# Patient Record
Sex: FEMALE | Race: OTHER | Employment: OTHER | URBAN - METROPOLITAN AREA
[De-identification: names, ages, dates, MRNs, and addresses within clinical notes are randomized per-mention and may not be internally consistent; named-entity substitution may affect disease eponyms.]

---

## 2018-09-20 ENCOUNTER — OFFICE VISIT (OUTPATIENT)
Dept: ONCOLOGY | Age: 53
End: 2018-09-20

## 2018-09-20 ENCOUNTER — PATIENT OUTREACH (OUTPATIENT)
Dept: ONCOLOGY | Age: 53
End: 2018-09-20

## 2018-09-20 VITALS
BODY MASS INDEX: 43.19 KG/M2 | RESPIRATION RATE: 16 BRPM | TEMPERATURE: 98.1 F | HEIGHT: 64 IN | SYSTOLIC BLOOD PRESSURE: 141 MMHG | OXYGEN SATURATION: 94 % | WEIGHT: 253 LBS | DIASTOLIC BLOOD PRESSURE: 88 MMHG | HEART RATE: 61 BPM

## 2018-09-20 DIAGNOSIS — C50.919 METASTATIC BREAST CANCER (HCC): Primary | ICD-10-CM

## 2018-09-20 DIAGNOSIS — C79.51 CANCER, METASTATIC TO BONE (HCC): ICD-10-CM

## 2018-09-20 PROBLEM — E66.01 OBESITY, MORBID (HCC): Status: ACTIVE | Noted: 2018-09-20

## 2018-09-20 RX ORDER — BISMUTH SUBSALICYLATE 262 MG
TABLET,CHEWABLE ORAL
COMMUNITY

## 2018-09-20 RX ORDER — PALBOCICLIB 75 MG/1
CAPSULE ORAL
COMMUNITY
Start: 2018-09-04 | End: 2018-10-16 | Stop reason: SDUPTHER

## 2018-09-20 RX ORDER — METFORMIN HYDROCHLORIDE 500 MG/1
TABLET ORAL DAILY
COMMUNITY

## 2018-09-20 RX ORDER — LETROZOLE 2.5 MG/1
TABLET, FILM COATED ORAL
Refills: 1 | COMMUNITY
Start: 2018-09-14 | End: 2018-10-16 | Stop reason: SDUPTHER

## 2018-09-20 RX ORDER — MULTIVITAMIN
1 TABLET ORAL DAILY
COMMUNITY

## 2018-09-20 RX ORDER — ASCORBIC ACID 250 MG
500 TABLET ORAL 2 TIMES DAILY
COMMUNITY

## 2018-09-20 NOTE — MR AVS SNAPSHOT
[address] [facility] 209 1400 [address] [place] [address] 
[phone] Patient: Evon Lopez MRN: [id number] :1965 Visit Information Date & Time Provider Department Dept. Phone Encounter #  
 2018  1:00 PM Gay Pena [address] [place] [address] Oncology at [facility] [phone] Follow-up Instructions Return in about 4 weeks (around 10/18/2018). Follow-up and Disposition History Upcoming Health Maintenance Date Due Hepatitis C Screening 1965 DTaP/Tdap/Td series (1 - Tdap) 1984 PAP AKA CERVICAL CYTOLOGY 1984 BREAST CANCER SCRN MAMMOGRAM 2013 FOBT Q 1 YEAR AGE 50-75 2013 Influenza Age 5 to Adult 2018 Allergies as of 2018  Review Complete On: 2018 By: Gay Pena, DO Not on File Current Immunizations  Reviewed on 2018 No immunizations on file. Reviewed by Eryn Sanchez LPN on  at  1:07 PM  
You Were Diagnosed With   
  
 Codes Comments Metastatic breast cancer ([facility]    -  Primary ICD-10-CM: Q11.784 ICD-9-CM: 174.9 Cancer, metastatic to bone [facility])     ICD-10-CM: C79.51 
ICD-9-CM: 198.5 Vitals BP Pulse Temp Resp Height(growth percentile) Weight(growth percentile) 141/88 61 98.1 °F (36.7 °C) (Oral) 16 5' 4\" (1.626 m) 253 lb (114.8 kg) SpO2 BMI Smoking Status 94% 43.43 kg/m2 Never Smoker BMI and BSA Data Body Mass Index Body Surface Area  
 43.43 kg/m 2 2.28 m 2 Preferred Pharmacy Pharmacy Name Phone [facility], [address] [phone] Your Updated Medication List  
  
   
This list is accurate as of 18  2:21 PM.  Always use your most recent med list.  
  
  
  
  
 calcium-cholecalciferol (D3) tablet Commonly known as:  CALTRATE 600+D Take 1 Tab by mouth daily. FEMARA 2.5 mg tablet Generic drug:  letrozole TK 1 T PO QD  
  
 IBRANCE 75 mg Cap Generic drug:  palbociclib  
  
 metFORMIN 500 mg tablet Commonly known as:  GLUCOPHAGE Take  by mouth daily. multivitamin tablet Commonly known as:  ONE A DAY  
multivitamin PROBIOTIC 4X PO  
Probiotic VITAMIN C 250 mg tablet Generic drug:  ascorbic acid (vitamin C) Take 500 mg by mouth two (2) times a day. XGEVA SC Deceleste Norris We Performed the Following [place] 27.29 [14004 CPT(R)] CBC WITH AUTOMATED DIFF [29601 CPT(R)] METABOLIC PANEL, COMPREHENSIVE [18128 CPT(R)] REFERRAL TO INTERNAL MEDICINE [REF40 Custom] Comments:  
 Please evaluate patient for needs PCP moved here Follow-up Instructions Return in about 4 weeks (around 10/18/2018). To-Do List   
 09/20/2018 Imaging:  CT ABD PELV W WO CONT   
  
 09/20/2018 Imaging:  CT CHEST W WO CONT Referral Information Referral ID Referred By Referred To  
  
 [id number] Lionel Estrada Not Available Visits Status Start Date End Date 1 New Request 9/20/18 9/20/19 If your referral has a status of pending review or denied, additional information will be sent to support the outcome of this decision. Introducing [facility]! [facility] introduces Outsmart patient portal. Now you can access parts of your medical record, email your doctor's office, and request medication refills online. 1. In your internet browser, go to [email or web address] 2. Click on the First Time User? Click Here link in the Sign In box. You will see the New Member Sign Up page. 3. Enter your Outsmart Access Code exactly as it appears below. You will not need to use this code after youve completed the sign-up process. If you do not sign up before the expiration date, you must request a new code. · Outsmart Access Code: [email or web address] Expires: 12/19/2018  2:21 PM 
 
 4. Enter the last four digits of your Social Security Number (xxxx) and Date of Birth (mm/dd/yyyy) as indicated and click Submit. You will be taken to the next sign-up page. 5. Create a DocTree ID. This will be your DocTree login ID and cannot be changed, so think of one that is secure and easy to remember. 6. Create a DocTree password. You can change your password at any time. 7. Enter your Password Reset Question and Answer. This can be used at a later time if you forget your password. 8. Enter your e-mail address. You will receive e-mail notification when new information is available in [address] 9. Click Sign Up. You can now view and download portions of your medical record. 10. Click the Download Summary menu link to download a portable copy of your medical information. If you have questions, please visit the Frequently Asked Questions section of the DocTree website. Remember, DocTree is NOT to be used for urgent needs. For medical emergencies, dial 911. Now available from your iPhone and Android! Please provide this summary of care documentation to your next provider. If you have any questions after today's visit, please call [phone].

## 2018-09-20 NOTE — PROGRESS NOTES
[employer] Medical Oncology at [facility]  
Nurse Navigator Note Patient Name: Dacia Chaudhari YOB: 1965 Advance Care Planning No flowsheet data found. Patient seen in consultation with Dr. Cathy Ahmadi. Patient attends visit alone today with her sister Rosa Maria Mishra on the phone. Patient is  and has recently transferred back to [place] with [employer]. She transferred to [place] from [place] about 9 years ago. She is independent and denies barriers. Dr. Angelica Morales plan: 
Assessment/PLAN:  
  
1)  Metastatic breast cancer ER+ HER2 negative to bones and liver from [place]. Records reviewed. Reviewed history with pt today. Prior stage 1 breast cancer 2011 and 2016. Pt reports prior hx of liver mets with hx of chemo IV and xeloda. No complete records from [place] onocology. Pt is a good historian. No evidence of liver mets now per pt. Last PET 3/18 negative except for bone mets. No recent labs here yet. Reviewed current treatment plan with pt today. Pt's sister is on phone during whole visit today. Navigator present for visit today. Pt is doing well on current regimen of femara/ ibrance/ xgeva. Will do CTs for re eval.  Ordered. Will do labs with PF. Set up xgeva here monthly. Pt clinically has no pain and is doing well overall.  
  
2) hx of reported liver mets with changes on CT chronically. Need records regarding this. Pt states no evidence of liver disease now.   
3)  Possible preDM. Pt needs PCP here and will help set up.  
  
4) pt works for [employer]. Moving back here from [place]. Sister on phone today for support. Visit done with navigator.  
  
F/u here in a month. Oncology Navigator Psychosocial Assessment Reason for Assessment:   
[]Depression  []Anxiety  []Caregiver [place]  []Maladaptive Coping with Serious Illness   [x]Other:Establishing care in [place] Sources of Information: [x]Patient  []Family  []Staff  []Medical Record Mental Status:   
[x]Alert  []Lethargic  []Unresponsive Oriented to:  [x]Person  [x]Place  [x]Time  [x]Situation Barriers to Learning:   
[]Language  []Developmental  []Cognitive  []Altered Mental Status  []Visual/Hearing Impairment  []Unable to Read/Write  []Motivational   [x]No Barriers Identified  []Other: 
 
Relationship Status: 
[]Single  [x]  []Significant Other/Life Partner  []  []  [] Living Circumstances: 
[]Lives Alone  [x]Family/Significant Other in Household  []Roommates  []Children in the Home  []Paid Caregivers  []Assisted Living Facility/Group Home  []Skilled [address]  []Homeless  []Incarcerated  []Environmental/Care Concerns  []Other: 
 
Support System:   
[x]Strong  []Fair  []Limited Financial/Legal Concerns:   
[]Uninsured  []Limited Income/Resources  []Non-Citizen  [x]No Concerns Identified  []Financial POA:   
[]Other: 
 
Sikh/Spiritual/Existential: 
[]Strong Sense of Spirituality  []Involved in [employer] []Request  Visit  []Expressing Spiritual/Existential Angst  [x]No Concerns Identified Coping with Illness:       
 Patient: Family/Caregiver:  
Understanding and Acceptance of Illness/Prognosis  [] [] Strong Sense of Resilience [] []  
Self Reflection [] [] Engaged Support System [] [] Does not Readily Discuss Illness [x] [] Denial of Terminal Status [] [] Anger [] [] Depression [] [] Anxiety/Fear [] []  
Bargaining [] [] Recent Diagnosis/Prognosis [] [] Difficulties with Body Image [] [] Loss of Identity [] [] Excessive Substance Use [] [] Mental Health History [] []  
Enmeshed Relationships [] [] History of Loss [] [] Anticipatory Grief [] [] Concern for Complicated Grief [] [] Suicidal Ideation or Plan [] [] Unable to assess [] [] Referrals:  
 
I. Transportation Medicaid (Kiki Chaves) [] ACS Road to Recovery [] Regional organization  [] Financial Assistance/Medication Access Patient assistance program (Care Card) [] Co-pay assistance  [] Leukemia & Lymphoma Society [] [address]  [] Patient [address] [] CancerCare  [] Emotional support Peer support group [] Local counseling [] Online support group [] Coordination of psychiatry consult [] Actions/Plan:  
 
NN introduced self and role. Contact information provided. Described integrative supports in CRC/BSCI available to patient and loved ones, including massage, yoga, support groups, counseling, art and music therapies and relaxation/meditation therapy. Reviewed benefits of MyChart account and encouraged activation. Patient verbalized understanding of above and agrees to contact us with questions or concerns.

## 2018-09-20 NOTE — PROGRESS NOTES
Cancer [place] at [address], [address], [address] Ma: [phone]  F: [phone]    Reason for Visit:   Marco Antonio Horner is a 52 y.o. female who is seen in consultation at the request of Dr. ROGER Schultz for evaluation of metastatic breast cancer. Treatment History:     Anastrozole/ ibrance/ xgeva started 7/18 in [place]    STAGE: dx 8/17 4 with L2 bone mets biopsied in [place]  Prior stage 1 RIGHT breast cancer 2016 lump/XRT tamoxifen    History of Present Illness:     Pt seen today for office consult for metastatic breast cancer ER+ HER2 negative seen and treated in [place]. Pt initially dx in 2011 with ? Met breast cancer to liver. Had chemo IV then xeloda. Had lupron. Pt had mammo 2015 which was abnormal and then had breast biopsy and breast cancer. Then pt had breast surgery/ XRT. During this time pt states liver mets \"went away\". F/u visit 2017 showed bone met lumbar spine. Pt was on arimidex. Pt is currently on femara/ ibrance/ xgeva since 3/18. Here today to establish care. Last PET 3/18 negative except bone. Liver contour abnormalities thought to be cirrhosis. No labs sent from [place]. Pt has a port. Pt works for [employer]. Pt denies pain. No past medical history on file. No past surgical history on file. Social History   Substance Use Topics    Smoking status: Never Smoker    Smokeless tobacco: Never Used    Alcohol use Not on file      No family history on file. Current Outpatient Prescriptions   Medication Sig    FEMARA 2.5 mg tablet TK 1 T PO QD    IBRANCE 75 mg cap     multivitamin (ONE A DAY) tablet multivitamin    B infantis/B ani/B ivory/B bifid (PROBIOTIC 4X PO) Probiotic    denosumab (XGEVA SC) Xgeva    metFORMIN (GLUCOPHAGE) 500 mg tablet Take  by mouth daily.  ascorbic acid, vitamin C, (VITAMIN C) 250 mg tablet Take 500 mg by mouth two (2) times a day.     calcium-cholecalciferol, D3, (CALTRATE 600+D) tablet Take 1 Tab by mouth daily. No current facility-administered medications for this visit. Not on File     Review of Systems: A complete review of systems was obtained, negative except as described above. Physical Exam:     Visit Vitals    /88    Pulse 61    Temp 98.1 °F (36.7 °C) (Oral)    Resp 16    Ht 5' 4\" (1.626 m)    Wt 253 lb (114.8 kg)    SpO2 94%    BMI 43.43 kg/m2     ECOG PS: 0  General: No distress  Eyes: PERRLA, anicteric sclerae  HENT: Atraumatic, OP clear  Neck: Supple  Lymphatic: No cervical, supraclavicular  Respiratory: CTAB, normal respiratory effort  CV: Normal rate, regular rhythm, no murmurs, no peripheral edema  GI: Soft, nontender, nondistended, no masses, no hepatomegaly, no splenomegaly  MS: Normal gait and station. Digits without clubbing or cyanosis. Skin: No rashes, ecchymoses, or petechiae. Normal temperature, turgor, and texture. Psych: Alert, oriented, appropriate affect, normal judgment/insight  Breast right breast small seroma at scar, no masses appreciated b/l     Results:   No results found for: WBC, HGB, HCT, PLT, MCV, ANEU, HGBPOC, HCTPOC, HGBEXT, HCTEXT, PLTEXT, HGBEXT, HCTEXT, PLTEXT  No results found for: NA, K, CL, CO2, GLU, BUN, CREA, GFRAA, GFRNA, CA, NAPOC, KPOCT, CLPOC, GLUCPOC, IBUN, CREAPOC, ICAI  No results found for: TBILI, ALT, SGOT, AP, TP, ALB, GLOB    None in our system  Records reviewed and summarized above. Pathology report(s) reviewed above. Radiology report(s) reviewed above. Assessment/PLAN:     1)  Metastatic breast cancer ER+ HER2 negative to bones and liver from [place]. Records reviewed. Reviewed history with pt today. Prior stage 1 breast cancer 2011 and 2016. Pt reports prior hx of liver mets with hx of chemo IV and xeloda. No complete records from [place] onocology. Pt is a good historian. No evidence of liver mets now per pt. Last PET 3/18 negative except for bone mets. No recent labs here yet.    Reviewed current treatment plan with pt today. Pt's sister is on phone during whole visit today. Navigator present for visit today. Pt is doing well on current regimen of femara/ ibrance/ xgeva. Will do CTs for re eval.  Ordered. Will do labs with PF. Set up xgeva here monthly. Pt clinically has no pain and is doing well overall. 2) hx of reported liver mets with changes on CT chronically. Need records regarding this. Pt states no evidence of liver disease now. 3)  Possible preDM. Pt needs PCP here and will help set up.     4) pt works for [employer]. Moving back here from [place]. Sister on phone today for support. Visit done with navigator. F/u here in a month. Call if questions. I appreciate the opportunity to participate in Ms. [facility].     Signed By: Ana Blair DO

## 2018-09-24 ENCOUNTER — TELEPHONE (OUTPATIENT)
Dept: ONCOLOGY | Age: 53
End: 2018-09-24

## 2018-09-24 NOTE — TELEPHONE ENCOUNTER
[phone]/Dayan  Per Dayan unable to obtain xgeva auth without recent calcium level. Rn advised would review records and obtain.

## 2018-09-24 NOTE — TELEPHONE ENCOUNTER
Call to previous oncology office to obtain most recent labs/Mray. Labs to be faxed from July and August.  Most recent Calcium 8.5 on 8/31/18.   Thanked for assist.

## 2018-09-25 RX ORDER — HEPARIN 100 UNIT/ML
300-500 SYRINGE INTRAVENOUS AS NEEDED
Status: CANCELLED
Start: 2018-09-27

## 2018-09-25 RX ORDER — SODIUM CHLORIDE 0.9 % (FLUSH) 0.9 %
10 SYRINGE (ML) INJECTION AS NEEDED
Status: CANCELLED
Start: 2018-09-27

## 2018-09-25 RX ORDER — SODIUM CHLORIDE 9 MG/ML
10 INJECTION INTRAMUSCULAR; INTRAVENOUS; SUBCUTANEOUS AS NEEDED
Status: CANCELLED | OUTPATIENT
Start: 2018-09-27

## 2018-09-27 ENCOUNTER — HOSPITAL ENCOUNTER (OUTPATIENT)
Dept: INFUSION THERAPY | Age: 53
Discharge: HOME | End: 2018-09-27
Payer: COMMERCIAL

## 2018-09-27 VITALS
DIASTOLIC BLOOD PRESSURE: 79 MMHG | HEART RATE: 67 BPM | RESPIRATION RATE: 18 BRPM | TEMPERATURE: 98.1 F | SYSTOLIC BLOOD PRESSURE: 123 MMHG

## 2018-09-27 DIAGNOSIS — C50.919 METASTATIC BREAST CANCER (HCC): ICD-10-CM

## 2018-09-27 DIAGNOSIS — C79.51 CANCER, METASTATIC TO BONE (HCC): ICD-10-CM

## 2018-09-27 LAB
ALBUMIN SERPL-MCNC: 3.7 G/DL (ref 3.5–5)
ALBUMIN/GLOB SERPL: 1.1 {RATIO} (ref 1.1–2.2)
ALP SERPL-CCNC: 48 U/L (ref 45–117)
ALT SERPL-CCNC: 28 U/L (ref 12–78)
ANION GAP SERPL CALC-SCNC: 7 MMOL/L (ref 5–15)
AST SERPL-CCNC: 24 U/L (ref 15–37)
BASOPHILS # BLD: 0.1 K/UL (ref 0–0.1)
BASOPHILS NFR BLD: 2 % (ref 0–1)
BILIRUB SERPL-MCNC: 0.5 MG/DL (ref 0.2–1)
BUN SERPL-MCNC: 19 MG/DL (ref 6–20)
BUN/CREAT SERPL: 18 (ref 12–20)
CALCIUM SERPL-MCNC: 8.5 MG/DL (ref 8.5–10.1)
CHLORIDE SERPL-SCNC: 106 MMOL/L (ref 97–108)
CO2 SERPL-SCNC: 27 MMOL/L (ref 21–32)
CREAT SERPL-MCNC: 1.05 MG/DL (ref 0.55–1.02)
DIFFERENTIAL METHOD BLD: ABNORMAL
EOSINOPHIL # BLD: 0.1 K/UL (ref 0–0.4)
EOSINOPHIL NFR BLD: 2 % (ref 0–7)
ERYTHROCYTE [DISTWIDTH] IN BLOOD BY AUTOMATED COUNT: 14.7 % (ref 11.5–14.5)
GLOBULIN SER CALC-MCNC: 3.3 G/DL (ref 2–4)
GLUCOSE SERPL-MCNC: 120 MG/DL (ref 65–100)
HCT VFR BLD AUTO: 36.3 % (ref 35–47)
HGB BLD-MCNC: 12 G/DL (ref 11.5–16)
IMM GRANULOCYTES # BLD: 0 K/UL (ref 0–0.04)
IMM GRANULOCYTES NFR BLD AUTO: 0 % (ref 0–0.5)
LYMPHOCYTES # BLD: 1.9 K/UL (ref 0.8–3.5)
LYMPHOCYTES NFR BLD: 50 % (ref 12–49)
MAGNESIUM SERPL-MCNC: 2 MG/DL (ref 1.6–2.4)
MCH RBC QN AUTO: 31.6 PG (ref 26–34)
MCHC RBC AUTO-ENTMCNC: 33.1 G/DL (ref 30–36.5)
MCV RBC AUTO: 95.5 FL (ref 80–99)
MONOCYTES # BLD: 0.3 K/UL (ref 0–1)
MONOCYTES NFR BLD: 9 % (ref 5–13)
NEUTS SEG # BLD: 1.4 K/UL (ref 1.8–8)
NEUTS SEG NFR BLD: 37 % (ref 32–75)
NRBC # BLD: 0 K/UL (ref 0–0.01)
NRBC BLD-RTO: 0 PER 100 WBC
PHOSPHATE SERPL-MCNC: 3.3 MG/DL (ref 2.6–4.7)
PLATELET # BLD AUTO: 160 K/UL (ref 150–400)
PMV BLD AUTO: 10.6 FL (ref 8.9–12.9)
POTASSIUM SERPL-SCNC: 3.9 MMOL/L (ref 3.5–5.1)
PROT SERPL-MCNC: 7 G/DL (ref 6.4–8.2)
RBC # BLD AUTO: 3.8 M/UL (ref 3.8–5.2)
SODIUM SERPL-SCNC: 140 MMOL/L (ref 136–145)
WBC # BLD AUTO: 3.8 K/UL (ref 3.6–11)

## 2018-09-27 PROCEDURE — 96372 THER/PROPH/DIAG INJ SC/IM: CPT

## 2018-09-27 PROCEDURE — 96523 IRRIG DRUG DELIVERY DEVICE: CPT

## 2018-09-27 PROCEDURE — 80053 COMPREHEN METABOLIC PANEL: CPT | Performed by: INTERNAL MEDICINE

## 2018-09-27 PROCEDURE — 86300 IMMUNOASSAY TUMOR CA 15-3: CPT | Performed by: INTERNAL MEDICINE

## 2018-09-27 PROCEDURE — 84100 ASSAY OF PHOSPHORUS: CPT | Performed by: INTERNAL MEDICINE

## 2018-09-27 PROCEDURE — 74011250636 HC RX REV CODE- 250/636: Performed by: NURSE PRACTITIONER

## 2018-09-27 PROCEDURE — 83735 ASSAY OF MAGNESIUM: CPT | Performed by: INTERNAL MEDICINE

## 2018-09-27 PROCEDURE — 77030012965 HC NDL HUBR BBMI -A

## 2018-09-27 PROCEDURE — 36415 COLL VENOUS BLD VENIPUNCTURE: CPT | Performed by: INTERNAL MEDICINE

## 2018-09-27 PROCEDURE — 85025 COMPLETE CBC W/AUTO DIFF WBC: CPT | Performed by: INTERNAL MEDICINE

## 2018-09-27 RX ORDER — SODIUM CHLORIDE 0.9 % (FLUSH) 0.9 %
10 SYRINGE (ML) INJECTION AS NEEDED
Status: ACTIVE | OUTPATIENT
Start: 2018-09-27 | End: 2018-09-28

## 2018-09-27 RX ORDER — SODIUM CHLORIDE 9 MG/ML
10 INJECTION INTRAMUSCULAR; INTRAVENOUS; SUBCUTANEOUS AS NEEDED
Status: ACTIVE | OUTPATIENT
Start: 2018-09-27 | End: 2018-09-28

## 2018-09-27 RX ORDER — HEPARIN 100 UNIT/ML
300-500 SYRINGE INTRAVENOUS AS NEEDED
Status: ACTIVE | OUTPATIENT
Start: 2018-09-27 | End: 2018-09-28

## 2018-09-27 RX ADMIN — DENOSUMAB 120 MG: 120 INJECTION SUBCUTANEOUS at 18:21

## 2018-09-27 NOTE — PROGRESS NOTES
Is pt being set up with ibrance and xgeva  Transfer here and was on these meds prior  Getting CTs also

## 2018-09-27 NOTE — PROGRESS NOTES
1 Pt admit to [facility] for Xgeva/Port Flush ambulatory in stable condition. Assessment completed. No new concerns voiced. Port accessed and flushed with positive blood return. Labs drawn per order and sent for processing. Visit Vitals    /79    Pulse 67    Temp 98.1 °F (36.7 °C)    Resp 18    Breastfeeding No       Medications:  Xgeva SQ left arm    1825 Pt tolerated treatment well. D/c home ambulatory in no distress. Pt aware of next [facility] appointment scheduled for 10/25/18. Recent Results (from the past 12 hour(s))   CBC WITH AUTOMATED DIFF    Collection Time: 09/27/18  4:52 PM   Result Value Ref Range    WBC 3.8 3.6 - 11.0 K/uL    RBC 3.80 3.80 - 5.20 M/uL    HGB 12.0 11.5 - 16.0 g/dL    HCT 36.3 35.0 - 47.0 %    MCV 95.5 80.0 - 99.0 FL    MCH 31.6 26.0 - 34.0 PG    MCHC 33.1 30.0 - 36.5 g/dL    RDW 14.7 (H) 11.5 - 14.5 %    PLATELET 829 533 - 789 K/uL    MPV 10.6 8.9 - 12.9 FL    NRBC 0.0 0  WBC    ABSOLUTE NRBC 0.00 0.00 - 0.01 K/uL    NEUTROPHILS 37 32 - 75 %    LYMPHOCYTES 50 (H) 12 - 49 %    MONOCYTES 9 5 - 13 %    EOSINOPHILS 2 0 - 7 %    BASOPHILS 2 (H) 0 - 1 %    IMMATURE GRANULOCYTES 0 0.0 - 0.5 %    ABS. NEUTROPHILS 1.4 (L) 1.8 - 8.0 K/UL    ABS. LYMPHOCYTES 1.9 0.8 - 3.5 K/UL    ABS. MONOCYTES 0.3 0.0 - 1.0 K/UL    ABS. EOSINOPHILS 0.1 0.0 - 0.4 K/UL    ABS. BASOPHILS 0.1 0.0 - 0.1 K/UL    ABS. IMM.  GRANS. 0.0 0.00 - 0.04 K/UL    DF AUTOMATED     METABOLIC PANEL, COMPREHENSIVE    Collection Time: 09/27/18  4:52 PM   Result Value Ref Range    Sodium 140 136 - 145 mmol/L    Potassium 3.9 3.5 - 5.1 mmol/L    Chloride 106 97 - 108 mmol/L    CO2 27 21 - 32 mmol/L    Anion gap 7 5 - 15 mmol/L    Glucose 120 (H) 65 - 100 mg/dL    BUN 19 6 - 20 MG/DL    Creatinine 1.05 (H) 0.55 - 1.02 MG/DL    BUN/Creatinine ratio 18 12 - 20      GFR est AA >60 >60 ml/min/1.73m2    GFR est non-AA 54 (L) >60 ml/min/1.73m2    Calcium 8.5 8.5 - 10.1 MG/DL    Bilirubin, total 0.5 0.2 - 1.0 MG/DL    ALT (SGPT) 28 12 - 78 U/L    AST (SGOT) 24 15 - 37 U/L    Alk.  phosphatase 48 45 - 117 U/L    Protein, total 7.0 6.4 - 8.2 g/dL    Albumin 3.7 3.5 - 5.0 g/dL    Globulin 3.3 2.0 - 4.0 g/dL    A-G Ratio 1.1 1.1 - 2.2     MAGNESIUM    Collection Time: 09/27/18  4:52 PM   Result Value Ref Range    Magnesium 2.0 1.6 - 2.4 mg/dL   PHOSPHORUS    Collection Time: 09/27/18  4:52 PM   Result Value Ref Range    Phosphorus 3.3 2.6 - 4.7 MG/DL

## 2018-09-28 NOTE — PROGRESS NOTES
Call to patient to clarify Ibrance dosing. No answer - left VM to return call. Will order at that time. Orders placed for Xgeva.

## 2018-09-29 LAB — CANCER AG27-29 SERPL-ACNC: 24.8 U/ML (ref 0–38.6)

## 2018-10-16 ENCOUNTER — TELEPHONE (OUTPATIENT)
Dept: ONCOLOGY | Age: 53
End: 2018-10-16

## 2018-10-16 DIAGNOSIS — C50.919 METASTATIC BREAST CANCER (HCC): Primary | ICD-10-CM

## 2018-10-16 RX ORDER — LETROZOLE 2.5 MG/1
2.5 TABLET, FILM COATED ORAL DAILY
Qty: 30 TAB | Refills: 3 | Status: CANCELLED | OUTPATIENT
Start: 2018-10-16

## 2018-10-16 NOTE — TELEPHONE ENCOUNTER
HIPAA verified. Stated family hx of breast cancer in mother. Stated had BRCA testing 2012 which was negative. Sister concerned about developing breast cancer and is on breast cancer prevention. Patient wanted JEANNIE testing as recommended by sister's providers. Patient inquired if insurance would cover testing. RN advised to contact customer service on back of patient's insurance card to inquire about genetic testing. RN inquired if patient is continuing to receive Ibrance through out of state provider as would need 1-2 weeks to transition to our providers. Patient stated to order ibrance and femara now. Stated will call back with local pharmacy. Ibrance filled through [facility]. Advised would forward to provider for review and scripts.

## 2018-10-17 NOTE — TELEPHONE ENCOUNTER
HIPAA verified. Advised of provider note. Patient stated is in process of moving from [place] and will be in [place] next week. Patient stated is taking 75 mg of ibrance. Stated has not chosen local pharmacy yet. Stated had not heard from schedulers for CT scan. Advised notes stated LM 9/24/18 and RN gave scheduling contact #. Patient stated that she spoke with insurance company and genetic testing is covered for breast cancer prevention/sister. Patient stated that she has a code to use for this test.    Patient to call with info as per note. Verbalized understanding.

## 2018-10-23 RX ORDER — SODIUM CHLORIDE 0.9 % (FLUSH) 0.9 %
10 SYRINGE (ML) INJECTION AS NEEDED
Status: CANCELLED
Start: 2018-10-25

## 2018-10-23 RX ORDER — HEPARIN 100 UNIT/ML
300-500 SYRINGE INTRAVENOUS AS NEEDED
Status: CANCELLED
Start: 2018-11-28

## 2018-10-23 RX ORDER — HEPARIN 100 UNIT/ML
300-500 SYRINGE INTRAVENOUS AS NEEDED
Status: CANCELLED
Start: 2018-12-20

## 2018-10-23 RX ORDER — SODIUM CHLORIDE 0.9 % (FLUSH) 0.9 %
10 SYRINGE (ML) INJECTION AS NEEDED
Status: CANCELLED
Start: 2018-12-20

## 2018-10-23 RX ORDER — SODIUM CHLORIDE 9 MG/ML
10 INJECTION INTRAMUSCULAR; INTRAVENOUS; SUBCUTANEOUS AS NEEDED
Status: CANCELLED | OUTPATIENT
Start: 2018-11-28

## 2018-10-23 RX ORDER — SODIUM CHLORIDE 9 MG/ML
10 INJECTION INTRAMUSCULAR; INTRAVENOUS; SUBCUTANEOUS AS NEEDED
Status: CANCELLED | OUTPATIENT
Start: 2018-10-25

## 2018-10-23 RX ORDER — HEPARIN 100 UNIT/ML
300-500 SYRINGE INTRAVENOUS AS NEEDED
Status: CANCELLED
Start: 2018-10-25

## 2018-10-23 RX ORDER — SODIUM CHLORIDE 0.9 % (FLUSH) 0.9 %
10 SYRINGE (ML) INJECTION AS NEEDED
Status: CANCELLED
Start: 2018-11-28

## 2018-10-23 RX ORDER — SODIUM CHLORIDE 9 MG/ML
10 INJECTION INTRAMUSCULAR; INTRAVENOUS; SUBCUTANEOUS AS NEEDED
Status: CANCELLED | OUTPATIENT
Start: 2018-12-20

## 2018-10-23 NOTE — TELEPHONE ENCOUNTER
[id number]   Spoke with Robert Vogel @ [address] Per Robert Vogel stated that [address] contacted [place] provider/Dr. Yani Soto for refill, but no refill recd. RN advised that patient transferring care to [place]. Fax for [address] is:  [phone]. Ibrance script faxed complete.

## 2018-10-25 ENCOUNTER — HOSPITAL ENCOUNTER (OUTPATIENT)
Dept: INFUSION THERAPY | Age: 53
Discharge: HOME | End: 2018-10-25
Payer: COMMERCIAL

## 2018-10-25 VITALS
HEART RATE: 62 BPM | DIASTOLIC BLOOD PRESSURE: 79 MMHG | SYSTOLIC BLOOD PRESSURE: 127 MMHG | TEMPERATURE: 98 F | RESPIRATION RATE: 18 BRPM

## 2018-10-25 DIAGNOSIS — C50.919 METASTATIC BREAST CANCER (HCC): Primary | ICD-10-CM

## 2018-10-25 DIAGNOSIS — C79.51 CANCER, METASTATIC TO BONE (HCC): ICD-10-CM

## 2018-10-25 LAB
ALBUMIN SERPL-MCNC: 3.6 G/DL (ref 3.5–5)
ALBUMIN/GLOB SERPL: 1 {RATIO} (ref 1.1–2.2)
ALP SERPL-CCNC: 44 U/L (ref 45–117)
ALT SERPL-CCNC: 23 U/L (ref 12–78)
ANION GAP SERPL CALC-SCNC: 7 MMOL/L (ref 5–15)
AST SERPL-CCNC: 16 U/L (ref 15–37)
BASOPHILS # BLD: 0.1 K/UL (ref 0–0.1)
BASOPHILS NFR BLD: 1 % (ref 0–1)
BILIRUB SERPL-MCNC: 0.5 MG/DL (ref 0.2–1)
BUN SERPL-MCNC: 16 MG/DL (ref 6–20)
BUN/CREAT SERPL: 16 (ref 12–20)
CALCIUM SERPL-MCNC: 8.4 MG/DL (ref 8.5–10.1)
CHLORIDE SERPL-SCNC: 106 MMOL/L (ref 97–108)
CO2 SERPL-SCNC: 26 MMOL/L (ref 21–32)
CREAT SERPL-MCNC: 0.98 MG/DL (ref 0.55–1.02)
DIFFERENTIAL METHOD BLD: ABNORMAL
EOSINOPHIL # BLD: 0.1 K/UL (ref 0–0.4)
EOSINOPHIL NFR BLD: 3 % (ref 0–7)
ERYTHROCYTE [DISTWIDTH] IN BLOOD BY AUTOMATED COUNT: 14.6 % (ref 11.5–14.5)
GLOBULIN SER CALC-MCNC: 3.7 G/DL (ref 2–4)
GLUCOSE SERPL-MCNC: 100 MG/DL (ref 65–100)
HCT VFR BLD AUTO: 37.4 % (ref 35–47)
HGB BLD-MCNC: 12.2 G/DL (ref 11.5–16)
IMM GRANULOCYTES # BLD: 0 K/UL (ref 0–0.04)
IMM GRANULOCYTES NFR BLD AUTO: 1 % (ref 0–0.5)
LYMPHOCYTES # BLD: 1.9 K/UL (ref 0.8–3.5)
LYMPHOCYTES NFR BLD: 53 % (ref 12–49)
MAGNESIUM SERPL-MCNC: 2.2 MG/DL (ref 1.6–2.4)
MCH RBC QN AUTO: 30.7 PG (ref 26–34)
MCHC RBC AUTO-ENTMCNC: 32.6 G/DL (ref 30–36.5)
MCV RBC AUTO: 94.2 FL (ref 80–99)
MONOCYTES # BLD: 0.2 K/UL (ref 0–1)
MONOCYTES NFR BLD: 6 % (ref 5–13)
NEUTS SEG # BLD: 1.3 K/UL (ref 1.8–8)
NEUTS SEG NFR BLD: 36 % (ref 32–75)
NRBC # BLD: 0 K/UL (ref 0–0.01)
NRBC BLD-RTO: 0 PER 100 WBC
PHOSPHATE SERPL-MCNC: 3.6 MG/DL (ref 2.6–4.7)
PLATELET # BLD AUTO: 143 K/UL (ref 150–400)
PMV BLD AUTO: 11.1 FL (ref 8.9–12.9)
POTASSIUM SERPL-SCNC: 3.7 MMOL/L (ref 3.5–5.1)
PROT SERPL-MCNC: 7.3 G/DL (ref 6.4–8.2)
RBC # BLD AUTO: 3.97 M/UL (ref 3.8–5.2)
SODIUM SERPL-SCNC: 139 MMOL/L (ref 136–145)
WBC # BLD AUTO: 3.5 K/UL (ref 3.6–11)

## 2018-10-25 PROCEDURE — 96372 THER/PROPH/DIAG INJ SC/IM: CPT

## 2018-10-25 PROCEDURE — 86300 IMMUNOASSAY TUMOR CA 15-3: CPT | Performed by: INTERNAL MEDICINE

## 2018-10-25 PROCEDURE — 83735 ASSAY OF MAGNESIUM: CPT | Performed by: INTERNAL MEDICINE

## 2018-10-25 PROCEDURE — 85025 COMPLETE CBC W/AUTO DIFF WBC: CPT | Performed by: INTERNAL MEDICINE

## 2018-10-25 PROCEDURE — 74011250636 HC RX REV CODE- 250/636: Performed by: INTERNAL MEDICINE

## 2018-10-25 PROCEDURE — 36415 COLL VENOUS BLD VENIPUNCTURE: CPT | Performed by: INTERNAL MEDICINE

## 2018-10-25 PROCEDURE — 80053 COMPREHEN METABOLIC PANEL: CPT | Performed by: INTERNAL MEDICINE

## 2018-10-25 PROCEDURE — 84100 ASSAY OF PHOSPHORUS: CPT | Performed by: INTERNAL MEDICINE

## 2018-10-25 RX ORDER — SODIUM CHLORIDE 9 MG/ML
10 INJECTION INTRAMUSCULAR; INTRAVENOUS; SUBCUTANEOUS AS NEEDED
Status: ACTIVE | OUTPATIENT
Start: 2018-10-25 | End: 2018-10-26

## 2018-10-25 RX ORDER — SODIUM CHLORIDE 0.9 % (FLUSH) 0.9 %
10 SYRINGE (ML) INJECTION AS NEEDED
Status: ACTIVE | OUTPATIENT
Start: 2018-10-25 | End: 2018-10-26

## 2018-10-25 RX ORDER — HEPARIN 100 UNIT/ML
300-500 SYRINGE INTRAVENOUS AS NEEDED
Status: ACTIVE | OUTPATIENT
Start: 2018-10-25 | End: 2018-10-26

## 2018-10-25 RX ADMIN — DENOSUMAB 120 MG: 120 INJECTION SUBCUTANEOUS at 18:26

## 2018-10-25 NOTE — PROGRESS NOTES
1 Pt admit to [facility] for Xgeva/Port Flush ambulatory in stable condition. Assessment completed. No new concerns voiced. Port accessed and flushed with positive blood return. Labs drawn per order and sent for processing. Visit Vitals  /79   Pulse 62   Temp 98 °F (36.7 °C)   Resp 18   Breastfeeding? No       Medications:  Xgeva SQ left arm    1800 Pt tolerated treatment well. D/c home ambulatory in no distress. Pt aware of next [facility] appointment scheduled for 11/28/18. Recent Results (from the past 12 hour(s))   CBC WITH AUTOMATED DIFF    Collection Time: 10/25/18  5:04 PM   Result Value Ref Range    WBC 3.5 (L) 3.6 - 11.0 K/uL    RBC 3.97 3.80 - 5.20 M/uL    HGB 12.2 11.5 - 16.0 g/dL    HCT 37.4 35.0 - 47.0 %    MCV 94.2 80.0 - 99.0 FL    MCH 30.7 26.0 - 34.0 PG    MCHC 32.6 30.0 - 36.5 g/dL    RDW 14.6 (H) 11.5 - 14.5 %    PLATELET 434 (L) 484 - 400 K/uL    MPV 11.1 8.9 - 12.9 FL    NRBC 0.0 0  WBC    ABSOLUTE NRBC 0.00 0.00 - 0.01 K/uL    NEUTROPHILS 36 32 - 75 %    LYMPHOCYTES 53 (H) 12 - 49 %    MONOCYTES 6 5 - 13 %    EOSINOPHILS 3 0 - 7 %    BASOPHILS 1 0 - 1 %    IMMATURE GRANULOCYTES 1 (H) 0.0 - 0.5 %    ABS. NEUTROPHILS 1.3 (L) 1.8 - 8.0 K/UL    ABS. LYMPHOCYTES 1.9 0.8 - 3.5 K/UL    ABS. MONOCYTES 0.2 0.0 - 1.0 K/UL    ABS. EOSINOPHILS 0.1 0.0 - 0.4 K/UL    ABS. BASOPHILS 0.1 0.0 - 0.1 K/UL    ABS. IMM.  GRANS. 0.0 0.00 - 0.04 K/UL    DF AUTOMATED

## 2018-10-26 ENCOUNTER — TELEPHONE (OUTPATIENT)
Dept: ONCOLOGY | Age: 53
End: 2018-10-26

## 2018-10-26 NOTE — PROGRESS NOTES
HIPAA verified. Advised of provider note. [facility]. Has not picked local pharmacy yet as is moving. Inquired about genetic testing . RN advised to make appt as per last office note to discuss with provider.   Verbalized understanding and thanked for assist.

## 2018-10-26 NOTE — TELEPHONE ENCOUNTER
Patient called and stated that she got a code from insurance (code [id number]) Jr [place] for gentic testing, and would like to see if the doctor would approve it.      # [phone]

## 2018-10-26 NOTE — TELEPHONE ENCOUNTER
Patient advised could discuss genetic testing with provider at next office visit in call 10/26/18 result note.

## 2018-10-27 LAB — CANCER AG27-29 SERPL-ACNC: 21.4 U/ML (ref 0–38.6)

## 2018-10-31 NOTE — TELEPHONE ENCOUNTER
Patient has office appointment 11/8/18 and will obtain genetic testing at that time per provider order.

## 2018-11-02 ENCOUNTER — TELEPHONE (OUTPATIENT)
Dept: ONCOLOGY | Age: 53
End: 2018-11-02

## 2018-11-02 DIAGNOSIS — C50.919 METASTATIC BREAST CANCER (HCC): Primary | ICD-10-CM

## 2018-11-02 DIAGNOSIS — C79.51 CANCER, METASTATIC TO BONE (HCC): ICD-10-CM

## 2018-11-02 NOTE — TELEPHONE ENCOUNTER
Stated P2P needed for CPT codes:  85186 and 41140 for test scheduled 11/3/18. Per Brad Michael notes reflect that out office was called. Chart reviewed and no documentation of call. RN advised office not able to accommodate request this late in day. Brad Michael stated would call patient and reschedule to next Tuesday. Case #:  [id number]  [employer] #:  [phone]  To provider for review.

## 2018-11-06 ENCOUNTER — TELEPHONE (OUTPATIENT)
Dept: ONCOLOGY | Age: 53
End: 2018-11-06

## 2018-11-06 NOTE — TELEPHONE ENCOUNTER
Per Carlos Gilbert requesting to know status of CT scan. Orders re-entered 11/2/18 per NP. Media reflects recommendation of ordered scans. Carlos Gilbert will check ins approval and return call.

## 2018-11-07 ENCOUNTER — HOSPITAL ENCOUNTER (OUTPATIENT)
Dept: CT IMAGING | Age: 53
Discharge: HOME | End: 2018-11-07
Attending: INTERNAL MEDICINE
Payer: COMMERCIAL

## 2018-11-07 DIAGNOSIS — C79.51 CANCER, METASTATIC TO BONE (HCC): ICD-10-CM

## 2018-11-07 DIAGNOSIS — C50.919 METASTATIC BREAST CANCER (HCC): ICD-10-CM

## 2018-11-07 PROCEDURE — 74011000258 HC RX REV CODE- 258: Performed by: RADIOLOGY

## 2018-11-07 PROCEDURE — 74011636320 HC RX REV CODE- 636/320: Performed by: RADIOLOGY

## 2018-11-07 PROCEDURE — 71260 CT THORAX DX C+: CPT

## 2018-11-07 PROCEDURE — 74177 CT ABD & PELVIS W/CONTRAST: CPT

## 2018-11-07 PROCEDURE — 74011250636 HC RX REV CODE- 250/636

## 2018-11-07 RX ORDER — SODIUM CHLORIDE 0.9 % (FLUSH) 0.9 %
10 SYRINGE (ML) INJECTION
Status: COMPLETED | OUTPATIENT
Start: 2018-11-07 | End: 2018-11-07

## 2018-11-07 RX ORDER — HEPARIN 100 UNIT/ML
SYRINGE INTRAVENOUS
Status: COMPLETED
Start: 2018-11-07 | End: 2018-11-07

## 2018-11-07 RX ADMIN — IOPAMIDOL 100 ML: 755 INJECTION, SOLUTION INTRAVENOUS at 09:09

## 2018-11-07 RX ADMIN — Medication 10 ML: at 09:09

## 2018-11-07 RX ADMIN — HEPARIN 500 UNITS: 100 SYRINGE at 09:08

## 2018-11-07 RX ADMIN — SODIUM CHLORIDE 100 ML: 900 INJECTION, SOLUTION INTRAVENOUS at 09:09

## 2018-11-08 ENCOUNTER — DOCUMENTATION ONLY (OUTPATIENT)
Dept: ONCOLOGY | Age: 53
End: 2018-11-08

## 2018-11-08 ENCOUNTER — OFFICE VISIT (OUTPATIENT)
Dept: ONCOLOGY | Age: 53
End: 2018-11-08

## 2018-11-08 VITALS
HEART RATE: 66 BPM | OXYGEN SATURATION: 96 % | SYSTOLIC BLOOD PRESSURE: 117 MMHG | HEIGHT: 64 IN | RESPIRATION RATE: 16 BRPM | DIASTOLIC BLOOD PRESSURE: 84 MMHG | TEMPERATURE: 98.6 F | BODY MASS INDEX: 43.87 KG/M2 | WEIGHT: 257 LBS

## 2018-11-08 DIAGNOSIS — C50.919 METASTATIC BREAST CANCER (HCC): Primary | ICD-10-CM

## 2018-11-08 DIAGNOSIS — Z51.81 THERAPEUTIC DRUG MONITORING: ICD-10-CM

## 2018-11-08 DIAGNOSIS — C78.7 LIVER METASTASES (HCC): ICD-10-CM

## 2018-11-08 DIAGNOSIS — C79.51 CANCER, METASTATIC TO BONE (HCC): ICD-10-CM

## 2018-11-08 RX ORDER — LETROZOLE 2.5 MG/1
TABLET, FILM COATED ORAL
Refills: 1 | COMMUNITY
Start: 2018-10-17

## 2018-11-08 NOTE — PROGRESS NOTES
[facility] [place] at [address], [address] [place], [address] [place]: [phone]  F: [phone]    Reason for Visit:   Jorge Steele is a 52 y.o. female who is seen in consultation at the request of Dr. Cristina hickey for evaluation of metastatic breast cancer. Treatment History:     Anastrozole/ ibrance/ xgeva [place] 3/18 in [place]    STAGE: dx 8/17 4 with L2 bone mets biopsied in [place]  Prior stage 1 RIGHT breast cancer 2016 lump/XRT tamoxifen    History of Present Illness:     Pt seen today for office for f/u met breast cancer on ibrance/ Sonali Soles / Cachorro Cook. Pt if feeling well overall. Pt had CTs done here and compared to report from [place] which shows ? new liver lesion. Pt has known mets from past.   Labs good on ibrance. Pt is working a lot. Last visit:  consult for metastatic breast cancer ER+ HER2 negative seen and treated in [place]. Pt initially dx in 2011 with ? Met breast cancer to liver. Had chemo IV then xeloda. Had lupron. Pt had mammo 2015 which was abnormal and then had breast biopsy and breast cancer. Then pt had breast surgery/ XRT. During this time pt states liver mets \"went away\". F/u visit 2017 showed bone met lumbar spine. Pt was on arimidex. Pt is currently on femara/ ibrance/ xgeva since 3/18. Here today to establish care. Last PET 3/18 negative except bone. Liver contour abnormalities thought to be cirrhosis. No labs sent from [place]. Pt has a port. Pt works for [employer]. Pt denies pain.      Past Medical History:   Diagnosis Date    Arthritis     [facility] [address]     Diabetes ([facility] [address]       Past Surgical History:   Procedure Laterality Date    BREAST SURGERY PROCEDURE UNLISTED      partial mascetomies    HX LAP CHOLECYSTECTOMY        Social History     Tobacco Use    Smoking status: Never Smoker    Smokeless tobacco: Never Used   Substance Use Topics    Alcohol use: Yes      Family History   Problem Relation Age of Onset    Cancer Mother     Arthritis-osteo Mother     Hypertension Mother     Heart Disease Father     Hypertension Sister     Hypertension Brother      Current Outpatient Medications   Medication Sig    FEMARA 2.5 mg tablet TK 1 T PO QD    palbociclib 75 mg cap Take 1 capsule (75mg) daily for 21 days on followed by 7 days off    multivitamin (ONE A DAY) tablet multivitamin    B infantis/B ani/B ivory/B bifid (PROBIOTIC 4X PO) Probiotic    denosumab (XGEVA SC) Xgeva    metFORMIN (GLUCOPHAGE) 500 mg tablet Take  by mouth daily.  ascorbic acid, vitamin C, (VITAMIN C) 250 mg tablet Take 500 mg by mouth two (2) times a day.  calcium-cholecalciferol, D3, (CALTRATE 600+D) tablet Take 1 Tab by mouth daily. No current facility-administered medications for this visit. Allergies   Allergen Reactions    Adhesive Rash        Review of Systems: A complete review of systems was obtained, negative except as described above. Physical Exam:     Visit Vitals  /84   Pulse 66   Temp 98.6 °F (37 °C) (Oral)   Resp 16   Ht 5' 4\" (1.626 m)   Wt 257 lb (116.6 kg)   SpO2 96%   BMI 44.11 kg/m²     ECOG PS: 0  General: No distress  Eyes: PERRLA, anicteric sclerae  HENT: Atraumatic, OP clear  Neck: Supple  Lymphatic: No cervical, supraclavicular  Respiratory: CTAB, normal respiratory effort  CV: Normal rate, regular rhythm, no murmurs, no peripheral edema  GI: Soft, nondistended  MS: Normal gait and station. Skin: No rashes, ecchymoses, or petechiae. Normal temperature, turgor, and texture. Psych: Alert, oriented, appropriate affect, normal judgment/insight  Breast no masses palpable      Results:     Lab Results   Component Value Date/Time    WBC 3.5 (L) 10/25/2018 05:04 PM    HGB 12.2 10/25/2018 05:04 PM    HCT 37.4 10/25/2018 05:04 PM    PLATELET 053 (L) 93/24/8818 05:04 PM    MCV 94.2 10/25/2018 05:04 PM    ABS.  NEUTROPHILS 1.3 (L) 10/25/2018 05:04 PM     Lab Results   Component Value Date/Time    Sodium 139 10/25/2018 05:04 PM    Potassium 3.7 10/25/2018 05:04 PM    Chloride 106 10/25/2018 05:04 PM    CO2 26 10/25/2018 05:04 PM    Glucose 100 10/25/2018 05:04 PM    BUN 16 10/25/2018 05:04 PM    Creatinine 0.98 10/25/2018 05:04 PM    GFR est AA >60 10/25/2018 05:04 PM    GFR est non-AA 59 (L) 10/25/2018 05:04 PM    Calcium 8.4 (L) 10/25/2018 05:04 PM     Lab Results   Component Value Date/Time    Bilirubin, total 0.5 10/25/2018 05:04 PM    ALT (SGPT) 23 10/25/2018 05:04 PM    AST (SGOT) 16 10/25/2018 05:04 PM    Alk. phosphatase 44 (L) 10/25/2018 05:04 PM    Protein, total 7.3 10/25/2018 05:04 PM    Albumin 3.6 10/25/2018 05:04 PM    Globulin 3.7 10/25/2018 05:04 PM       None in our system  Records reviewed and summarized above. Pathology report(s) reviewed above. Radiology report(s) reviewed above. Assessment/PLAN:     1)  Metastatic breast cancer ER+ HER2 negative to bones and liver from [place]. Prior stage 1 breast cancer 2011 and 2016. Pt reports prior hx of liver mets with hx of chemo IV and xeloda. No complete records from [place] onocology. Pt is a good historian. No evidence of liver mets now per pt. Last PET 3/18 negative except for bone mets. Pt is doing well on current regimen of femara/ ibrance/ xgeva. CTs here show possible disease in liver and this needs to be monitored. Need radiology to compare [place] films 2/16 to here. [phone]  Will continue labs with PF. Labs stable. Set up xgeva here monthly. Pt clinically has no pain and is doing well overall. 2) hx of reported liver mets with changes on CT chronically. CT here with possible liver lesion 8mm. Will need to monitor. 3)  Possible preDM. Pt needs PCP here and will help set up.     4) pt works for [employer]. Moving back here from [place]. Stress due to work/ move. F/u here in a month at [facility]/ PF/ labs. Call if questions. I appreciate the opportunity to participate in [address]. [address] [facility].     Signed By: Mauricio Barney Efrain Hein, DO

## 2018-11-08 NOTE — PROGRESS NOTES
Pt brought prior CDs to send to radiology for comparison  Did radiology compare her CTs to prior PET report?   They did a comparison but no scans done here

## 2018-11-20 ENCOUNTER — DOCUMENTATION ONLY (OUTPATIENT)
Dept: ONCOLOGY | Age: 53
End: 2018-11-20

## 2018-11-22 ENCOUNTER — APPOINTMENT (OUTPATIENT)
Dept: INFUSION THERAPY | Age: 53
End: 2018-11-22
Payer: COMMERCIAL

## 2018-11-23 ENCOUNTER — APPOINTMENT (OUTPATIENT)
Dept: INFUSION THERAPY | Age: 53
End: 2018-11-23

## 2018-11-28 ENCOUNTER — HOSPITAL ENCOUNTER (OUTPATIENT)
Dept: INFUSION THERAPY | Age: 53
Discharge: HOME | End: 2018-11-28
Payer: COMMERCIAL

## 2018-11-28 DIAGNOSIS — C50.919 METASTATIC BREAST CANCER (HCC): Primary | ICD-10-CM

## 2018-11-28 DIAGNOSIS — C79.51 CANCER, METASTATIC TO BONE (HCC): ICD-10-CM

## 2018-11-28 LAB
ALBUMIN SERPL-MCNC: 3.9 G/DL (ref 3.5–5)
ALBUMIN/GLOB SERPL: 1.1 {RATIO} (ref 1.1–2.2)
ALP SERPL-CCNC: 51 U/L (ref 45–117)
ALT SERPL-CCNC: 27 U/L (ref 12–78)
ANION GAP SERPL CALC-SCNC: 6 MMOL/L (ref 5–15)
AST SERPL-CCNC: 22 U/L (ref 15–37)
BASOPHILS # BLD: 0.1 K/UL (ref 0–0.1)
BASOPHILS NFR BLD: 2 % (ref 0–1)
BILIRUB SERPL-MCNC: 0.6 MG/DL (ref 0.2–1)
BUN SERPL-MCNC: 16 MG/DL (ref 6–20)
BUN/CREAT SERPL: 17 (ref 12–20)
CALCIUM SERPL-MCNC: 8.3 MG/DL (ref 8.5–10.1)
CHLORIDE SERPL-SCNC: 105 MMOL/L (ref 97–108)
CO2 SERPL-SCNC: 26 MMOL/L (ref 21–32)
CREAT SERPL-MCNC: 0.92 MG/DL (ref 0.55–1.02)
DIFFERENTIAL METHOD BLD: ABNORMAL
EOSINOPHIL # BLD: 0.1 K/UL (ref 0–0.4)
EOSINOPHIL NFR BLD: 2 % (ref 0–7)
ERYTHROCYTE [DISTWIDTH] IN BLOOD BY AUTOMATED COUNT: 14.6 % (ref 11.5–14.5)
GLOBULIN SER CALC-MCNC: 3.7 G/DL (ref 2–4)
GLUCOSE SERPL-MCNC: 146 MG/DL (ref 65–100)
HCT VFR BLD AUTO: 38.4 % (ref 35–47)
HGB BLD-MCNC: 12.4 G/DL (ref 11.5–16)
IMM GRANULOCYTES # BLD: 0 K/UL (ref 0–0.04)
IMM GRANULOCYTES NFR BLD AUTO: 0 % (ref 0–0.5)
LYMPHOCYTES # BLD: 1.7 K/UL (ref 0.8–3.5)
LYMPHOCYTES NFR BLD: 41 % (ref 12–49)
MAGNESIUM SERPL-MCNC: 2.2 MG/DL (ref 1.6–2.4)
MCH RBC QN AUTO: 30.3 PG (ref 26–34)
MCHC RBC AUTO-ENTMCNC: 32.3 G/DL (ref 30–36.5)
MCV RBC AUTO: 93.9 FL (ref 80–99)
MONOCYTES # BLD: 0.3 K/UL (ref 0–1)
MONOCYTES NFR BLD: 8 % (ref 5–13)
NEUTS SEG # BLD: 1.9 K/UL (ref 1.8–8)
NEUTS SEG NFR BLD: 47 % (ref 32–75)
NRBC # BLD: 0 K/UL (ref 0–0.01)
NRBC BLD-RTO: 0 PER 100 WBC
PHOSPHATE SERPL-MCNC: 3 MG/DL (ref 2.6–4.7)
PLATELET # BLD AUTO: 140 K/UL (ref 150–400)
PMV BLD AUTO: 11.1 FL (ref 8.9–12.9)
POTASSIUM SERPL-SCNC: 3.9 MMOL/L (ref 3.5–5.1)
PROT SERPL-MCNC: 7.6 G/DL (ref 6.4–8.2)
RBC # BLD AUTO: 4.09 M/UL (ref 3.8–5.2)
SODIUM SERPL-SCNC: 137 MMOL/L (ref 136–145)
WBC # BLD AUTO: 4 K/UL (ref 3.6–11)

## 2018-11-28 PROCEDURE — 84100 ASSAY OF PHOSPHORUS: CPT

## 2018-11-28 PROCEDURE — 36415 COLL VENOUS BLD VENIPUNCTURE: CPT

## 2018-11-28 PROCEDURE — 85025 COMPLETE CBC W/AUTO DIFF WBC: CPT

## 2018-11-28 PROCEDURE — 80053 COMPREHEN METABOLIC PANEL: CPT

## 2018-11-28 PROCEDURE — 36591 DRAW BLOOD OFF VENOUS DEVICE: CPT

## 2018-11-28 PROCEDURE — 77030012965 HC NDL HUBR BBMI -A

## 2018-11-28 PROCEDURE — 86300 IMMUNOASSAY TUMOR CA 15-3: CPT

## 2018-11-28 PROCEDURE — 83735 ASSAY OF MAGNESIUM: CPT

## 2018-11-28 NOTE — PROGRESS NOTES
1700 Pt admit to [facility] for Xgeva/Port Flush ambulatory in stable condition. Assessment completed. No new concerns voiced. Port accessed and flushed with positive blood return. Labs drawn per order and sent for processing. Per Mayra Mckeon in pharmacy patient does not correct high enough to receive Xgeva today. Encouraged patient to call physician about calcium intact and inability to get the shot today. Medications: 
None 1815 Pt tolerated treatment well. D/c home ambulatory in no distress. Pt aware of next [facility] appointment scheduled for 12/20/18 1700. Recent Results (from the past 12 hour(s)) CBC WITH AUTOMATED DIFF Collection Time: 11/28/18  4:56 PM  
Result Value Ref Range WBC 4.0 3.6 - 11.0 K/uL  
 RBC 4.09 3.80 - 5.20 M/uL  
 HGB 12.4 11.5 - 16.0 g/dL HCT 38.4 35.0 - 47.0 % MCV 93.9 80.0 - 99.0 FL  
 MCH 30.3 26.0 - 34.0 PG  
 MCHC 32.3 30.0 - 36.5 g/dL  
 RDW 14.6 (H) 11.5 - 14.5 % PLATELET 755 (L) 523 - 400 K/uL MPV 11.1 8.9 - 12.9 FL  
 NRBC 0.0 0  WBC ABSOLUTE NRBC 0.00 0.00 - 0.01 K/uL NEUTROPHILS 47 32 - 75 % LYMPHOCYTES 41 12 - 49 % MONOCYTES 8 5 - 13 % EOSINOPHILS 2 0 - 7 % BASOPHILS 2 (H) 0 - 1 % IMMATURE GRANULOCYTES 0 0.0 - 0.5 % ABS. NEUTROPHILS 1.9 1.8 - 8.0 K/UL  
 ABS. LYMPHOCYTES 1.7 0.8 - 3.5 K/UL  
 ABS. MONOCYTES 0.3 0.0 - 1.0 K/UL  
 ABS. EOSINOPHILS 0.1 0.0 - 0.4 K/UL  
 ABS. BASOPHILS 0.1 0.0 - 0.1 K/UL  
 ABS. IMM. GRANS. 0.0 0.00 - 0.04 K/UL  
 DF AUTOMATED METABOLIC PANEL, COMPREHENSIVE Collection Time: 11/28/18  4:56 PM  
Result Value Ref Range Sodium 137 136 - 145 mmol/L Potassium 3.9 3.5 - 5.1 mmol/L Chloride 105 97 - 108 mmol/L  
 CO2 26 21 - 32 mmol/L Anion gap 6 5 - 15 mmol/L Glucose 146 (H) 65 - 100 mg/dL BUN 16 6 - 20 MG/DL Creatinine 0.92 0.55 - 1.02 MG/DL  
 BUN/Creatinine ratio 17 12 - 20 GFR est AA >60 >60 ml/min/1.73m2 GFR est non-AA >60 >60 ml/min/1.73m2 Calcium 8.3 (L) 8.5 - 10.1 MG/DL Bilirubin, total 0.6 0.2 - 1.0 MG/DL  
 ALT (SGPT) 27 12 - 78 U/L  
 AST (SGOT) 22 15 - 37 U/L Alk. phosphatase 51 45 - 117 U/L Protein, total 7.6 6.4 - 8.2 g/dL Albumin 3.9 3.5 - 5.0 g/dL Globulin 3.7 2.0 - 4.0 g/dL A-G Ratio 1.1 1.1 - 2.2 MAGNESIUM Collection Time: 11/28/18  4:56 PM  
Result Value Ref Range Magnesium 2.2 1.6 - 2.4 mg/dL PHOSPHORUS Collection Time: 11/28/18  4:56 PM  
Result Value Ref Range  Phosphorus 3.0 2.6 - 4.7 MG/DL

## 2018-11-29 ENCOUNTER — TELEPHONE (OUTPATIENT)
Dept: ONCOLOGY | Age: 53
End: 2018-11-29

## 2018-11-29 NOTE — TELEPHONE ENCOUNTER
HIPAA verified. Patient stated would like genetic testing RN advised will need 20 minute nurse appointment for saliva sample. Patient stated would check her schedule and return call. Patient also stated that xgeva was held due to low calcium last pm and wanted to know next step. RN advised would forward to provider for review and advise.

## 2018-11-29 NOTE — TELEPHONE ENCOUNTER
Patient called and stated that  She would like to do genetic testing and would like to speak to someone about her calcium levels.     # [phone]

## 2018-11-30 LAB — CANCER AG27-29 SERPL-ACNC: 28.7 U/ML (ref 0–38.6)

## 2018-12-05 ENCOUNTER — TELEPHONE (OUTPATIENT)
Dept: ONCOLOGY | Age: 53
End: 2018-12-05

## 2018-12-05 NOTE — TELEPHONE ENCOUNTER
Patient called and stated that she has a question about doing the swab for her genetic testing.     # [phone]

## 2018-12-05 NOTE — TELEPHONE ENCOUNTER
Call returned to patient. HIPAA verified. States she would like to come tomorrow afternoon after 3:00 pm for genetic testing. She asks about next Xgeva injection. Advised per note by provider, ok for Eliud Leal next time due. Noted [facility] appointment for 12/20/18 has been rescheduled for 12/26/18. Patient states she was not notified of this. Provided contact information for [facility] scheduling.

## 2018-12-06 ENCOUNTER — DOCUMENTATION ONLY (OUTPATIENT)
Dept: ONCOLOGY | Age: 53
End: 2018-12-06

## 2018-12-06 ENCOUNTER — TELEPHONE (OUTPATIENT)
Dept: ONCOLOGY | Age: 53
End: 2018-12-06

## 2018-12-06 NOTE — PROGRESS NOTES
Patient here for genetic testing. [facility] appt for xgeva was changed from 12/20 to 12/26. Patient stated attempted to change to 12/20 as will be out of town and was told by [facility] that pharmacy advised that appt would be too early. Patient did not receive xgeva in November due to low calcium. Message to [facility] pharmacy to update treatment to 12/20/18. Patient is aware.

## 2018-12-06 NOTE — PROGRESS NOTES
Informed consent obtained for [facility] genetic testing. All questions answered. Test explained. Specimen obtained per instructions.

## 2018-12-07 NOTE — TELEPHONE ENCOUNTER
Patient stated is taking calcium and vitamin D 2 or 3 times daily. Stated may have missed doses due to move. RN reinforced need for supplementation. Patient verbalized understanding.

## 2018-12-11 ENCOUNTER — TELEPHONE (OUTPATIENT)
Dept: ONCOLOGY | Age: 53
End: 2018-12-11

## 2018-12-11 NOTE — TELEPHONE ENCOUNTER
Confirmation tracking number for [facility] genetic testing;  [id number]. Package to KIMBER DO [facility] [place]  for .

## 2018-12-11 NOTE — TELEPHONE ENCOUNTER
Patient needs [facility] appt changed to 12/20/18 as will be out of town 12/26/18. Did not receive xgeva with last [facility] appt. VM left for [facility] /Sally with need to change appt.

## 2018-12-14 ENCOUNTER — DOCUMENTATION ONLY (OUTPATIENT)
Dept: ONCOLOGY | Age: 53
End: 2018-12-14

## 2018-12-20 ENCOUNTER — APPOINTMENT (OUTPATIENT)
Dept: INFUSION THERAPY | Age: 53
End: 2018-12-20
Payer: COMMERCIAL

## 2018-12-20 ENCOUNTER — OFFICE VISIT (OUTPATIENT)
Dept: ONCOLOGY | Age: 53
End: 2018-12-20

## 2018-12-20 ENCOUNTER — HOSPITAL ENCOUNTER (OUTPATIENT)
Dept: INFUSION THERAPY | Age: 53
Discharge: HOME | End: 2018-12-20
Payer: COMMERCIAL

## 2018-12-20 ENCOUNTER — DOCUMENTATION ONLY (OUTPATIENT)
Dept: ONCOLOGY | Age: 53
End: 2018-12-20

## 2018-12-20 VITALS
OXYGEN SATURATION: 96 % | SYSTOLIC BLOOD PRESSURE: 143 MMHG | TEMPERATURE: 98.8 F | WEIGHT: 253 LBS | HEART RATE: 60 BPM | HEIGHT: 64 IN | DIASTOLIC BLOOD PRESSURE: 84 MMHG | BODY MASS INDEX: 43.19 KG/M2 | RESPIRATION RATE: 16 BRPM

## 2018-12-20 VITALS
OXYGEN SATURATION: 95 % | HEART RATE: 67 BPM | TEMPERATURE: 98 F | RESPIRATION RATE: 18 BRPM | SYSTOLIC BLOOD PRESSURE: 137 MMHG | DIASTOLIC BLOOD PRESSURE: 86 MMHG

## 2018-12-20 DIAGNOSIS — Z51.81 THERAPEUTIC DRUG MONITORING: ICD-10-CM

## 2018-12-20 DIAGNOSIS — C50.919 METASTATIC BREAST CANCER (HCC): Primary | ICD-10-CM

## 2018-12-20 DIAGNOSIS — C79.51 CANCER, METASTATIC TO BONE (HCC): ICD-10-CM

## 2018-12-20 DIAGNOSIS — C78.7 LIVER METASTASES (HCC): ICD-10-CM

## 2018-12-20 DIAGNOSIS — D70.2 DRUG-INDUCED LEUKOPENIA (HCC): ICD-10-CM

## 2018-12-20 LAB
ALBUMIN SERPL-MCNC: 4 G/DL (ref 3.5–5)
ALBUMIN/GLOB SERPL: 1.1 {RATIO} (ref 1.1–2.2)
ALP SERPL-CCNC: 45 U/L (ref 45–117)
ALT SERPL-CCNC: 21 U/L (ref 12–78)
ANION GAP SERPL CALC-SCNC: 5 MMOL/L (ref 5–15)
AST SERPL-CCNC: 16 U/L (ref 15–37)
BASOPHILS # BLD: 0 K/UL (ref 0–0.1)
BASOPHILS NFR BLD: 1 % (ref 0–1)
BILIRUB SERPL-MCNC: 0.5 MG/DL (ref 0.2–1)
BUN SERPL-MCNC: 20 MG/DL (ref 6–20)
BUN/CREAT SERPL: 20 (ref 12–20)
CALCIUM SERPL-MCNC: 9.3 MG/DL (ref 8.5–10.1)
CHLORIDE SERPL-SCNC: 108 MMOL/L (ref 97–108)
CO2 SERPL-SCNC: 28 MMOL/L (ref 21–32)
CREAT SERPL-MCNC: 1.01 MG/DL (ref 0.55–1.02)
DIFFERENTIAL METHOD BLD: ABNORMAL
EOSINOPHIL # BLD: 0.1 K/UL (ref 0–0.4)
EOSINOPHIL NFR BLD: 3 % (ref 0–7)
ERYTHROCYTE [DISTWIDTH] IN BLOOD BY AUTOMATED COUNT: 14.8 % (ref 11.5–14.5)
GLOBULIN SER CALC-MCNC: 3.6 G/DL (ref 2–4)
GLUCOSE SERPL-MCNC: 80 MG/DL (ref 65–100)
HCT VFR BLD AUTO: 38.3 % (ref 35–47)
HGB BLD-MCNC: 12.4 G/DL (ref 11.5–16)
IMM GRANULOCYTES # BLD: 0 K/UL
IMM GRANULOCYTES NFR BLD AUTO: 0 %
LYMPHOCYTES # BLD: 1.6 K/UL (ref 0.8–3.5)
LYMPHOCYTES NFR BLD: 49 % (ref 12–49)
MAGNESIUM SERPL-MCNC: 2.2 MG/DL (ref 1.6–2.4)
MCH RBC QN AUTO: 30.2 PG (ref 26–34)
MCHC RBC AUTO-ENTMCNC: 32.4 G/DL (ref 30–36.5)
MCV RBC AUTO: 93.4 FL (ref 80–99)
MONOCYTES # BLD: 0.2 K/UL (ref 0–1)
MONOCYTES NFR BLD: 5 % (ref 5–13)
NEUTS SEG # BLD: 1.3 K/UL (ref 1.8–8)
NEUTS SEG NFR BLD: 42 % (ref 32–75)
NRBC # BLD: 0 K/UL (ref 0–0.01)
NRBC BLD-RTO: 0 PER 100 WBC
PHOSPHATE SERPL-MCNC: 3.6 MG/DL (ref 2.6–4.7)
PLATELET # BLD AUTO: 136 K/UL (ref 150–400)
PLATELET COMMENTS,PCOM: ABNORMAL
PMV BLD AUTO: 11.1 FL (ref 8.9–12.9)
POTASSIUM SERPL-SCNC: 3.9 MMOL/L (ref 3.5–5.1)
PROT SERPL-MCNC: 7.6 G/DL (ref 6.4–8.2)
RBC # BLD AUTO: 4.1 M/UL (ref 3.8–5.2)
RBC MORPH BLD: ABNORMAL
SODIUM SERPL-SCNC: 141 MMOL/L (ref 136–145)
WBC # BLD AUTO: 3.2 K/UL (ref 3.6–11)

## 2018-12-20 PROCEDURE — 96523 IRRIG DRUG DELIVERY DEVICE: CPT

## 2018-12-20 PROCEDURE — 96372 THER/PROPH/DIAG INJ SC/IM: CPT

## 2018-12-20 PROCEDURE — 83735 ASSAY OF MAGNESIUM: CPT

## 2018-12-20 PROCEDURE — 86300 IMMUNOASSAY TUMOR CA 15-3: CPT

## 2018-12-20 PROCEDURE — 77030012965 HC NDL HUBR BBMI -A

## 2018-12-20 PROCEDURE — 84100 ASSAY OF PHOSPHORUS: CPT

## 2018-12-20 PROCEDURE — 85025 COMPLETE CBC W/AUTO DIFF WBC: CPT

## 2018-12-20 PROCEDURE — 80053 COMPREHEN METABOLIC PANEL: CPT

## 2018-12-20 PROCEDURE — 36591 DRAW BLOOD OFF VENOUS DEVICE: CPT

## 2018-12-20 PROCEDURE — 74011000250 HC RX REV CODE- 250: Performed by: INTERNAL MEDICINE

## 2018-12-20 PROCEDURE — 36415 COLL VENOUS BLD VENIPUNCTURE: CPT

## 2018-12-20 PROCEDURE — 74011250636 HC RX REV CODE- 250/636: Performed by: INTERNAL MEDICINE

## 2018-12-20 RX ORDER — ASPIRIN 81 MG/1
TABLET ORAL DAILY
COMMUNITY

## 2018-12-20 RX ORDER — HEPARIN 100 UNIT/ML
300-500 SYRINGE INTRAVENOUS AS NEEDED
Status: ACTIVE | OUTPATIENT
Start: 2018-12-20 | End: 2018-12-21

## 2018-12-20 RX ORDER — SODIUM CHLORIDE 0.9 % (FLUSH) 0.9 %
10 SYRINGE (ML) INJECTION AS NEEDED
Status: ACTIVE | OUTPATIENT
Start: 2018-12-20 | End: 2018-12-21

## 2018-12-20 RX ORDER — LETROZOLE 2.5 MG/1
2.5 TABLET, FILM COATED ORAL DAILY
Qty: 90 TAB | Refills: 3 | Status: SHIPPED | OUTPATIENT
Start: 2018-12-20

## 2018-12-20 RX ORDER — SODIUM CHLORIDE 9 MG/ML
10 INJECTION INTRAMUSCULAR; INTRAVENOUS; SUBCUTANEOUS AS NEEDED
Status: ACTIVE | OUTPATIENT
Start: 2018-12-20 | End: 2018-12-21

## 2018-12-20 RX ADMIN — SODIUM CHLORIDE 10 ML: 9 INJECTION, SOLUTION INTRAMUSCULAR; INTRAVENOUS; SUBCUTANEOUS at 14:09

## 2018-12-20 RX ADMIN — DENOSUMAB 120 MG: 120 INJECTION SUBCUTANEOUS at 15:40

## 2018-12-20 RX ADMIN — Medication 10 ML: at 14:09

## 2018-12-20 RX ADMIN — Medication 500 UNITS: at 14:09

## 2018-12-20 NOTE — PATIENT INSTRUCTIONS
Results for Farooq Hansen (MRN [id number]) as of 12/20/2018 15:21   Ref. Range 9/27/2018 16:52 10/25/2018 17:04 11/28/2018 16:56 12/20/2018 14:08   WBC Latest Ref Range: 3.6 - 11.0 K/uL 3.8 3.5 (L) 4.0 3.2 (L)   NRBC Latest Ref Range: 0  WBC 0.0 0.0 0.0 0.0   RBC Latest Ref Range: 3.80 - 5.20 M/uL 3.80 3.97 4.09 4.10   HGB Latest Ref Range: 11.5 - 16.0 g/dL 12.0 12.2 12.4 12.4   HCT Latest Ref Range: 35.0 - 47.0 % 36.3 37.4 38.4 38.3   MCV Latest Ref Range: 80.0 - 99.0 FL 95.5 94.2 93.9 93.4   MCH Latest Ref Range: 26.0 - 34.0 PG 31.6 30.7 30.3 30.2   MCHC Latest Ref Range: 30.0 - 36.5 g/dL 33.1 32.6 32.3 32.4   RDW Latest Ref Range: 11.5 - 14.5 % 14.7 (H) 14.6 (H) 14.6 (H) 14.8 (H)   PLATELET Latest Ref Range: 150 - 400 K/uL 160 143 (L) 140 (L) 136 (L)   MPV Latest Ref Range: 8.9 - 12.9 FL 10.6 11.1 11.1 11.1   NEUTROPHILS Latest Ref Range: 32 - 75 % 37 36 47 42   LYMPHOCYTES Latest Ref Range: 12 - 49 % 50 (H) 53 (H) 41 49   MONOCYTES Latest Ref Range: 5 - 13 % 9 6 8 5   EOSINOPHILS Latest Ref Range: 0 - 7 % 2 3 2 3   BASOPHILS Latest Ref Range: 0 - 1 % 2 (H) 1 2 (H) 1   IMMATURE GRANULOCYTES Latest Units: % 0 1 (H) 0 0   DF Latest Units:   AUTOMATED AUTOMATED AUTOMATED MANUAL   ABSOLUTE NRBC Latest Ref Range: 0.00 - 0.01 K/uL 0.00 0.00 0.00 0.00   ABS. NEUTROPHILS Latest Ref Range: 1.8 - 8.0 K/UL 1.4 (L) 1.3 (L) 1.9 1.3 (L)   ABS. IMM. GRANS. Latest Units: K/UL 0.0 0.0 0.0 0.0   ABS. LYMPHOCYTES Latest Ref Range: 0.8 - 3.5 K/UL 1.9 1.9 1.7 1.6   ABS. MONOCYTES Latest Ref Range: 0.0 - 1.0 K/UL 0.3 0.2 0.3 0.2   ABS. EOSINOPHILS Latest Ref Range: 0.0 - 0.4 K/UL 0.1 0.1 0.1 0.1   ABS. BASOPHILS Latest Ref Range: 0.0 - 0.1 K/UL 0.1 0.1 0.1 0.0       Results for Farooq Hansen (MRN [id number]) as of 12/20/2018 15:21   Ref.  Range 9/27/2018 16:52 10/25/2018 17:04 11/28/2018 16:56 12/20/2018 14:08   Sodium Latest Ref Range: 136 - 145 mmol/L 140 139 137 141   Potassium Latest Ref Range: 3.5 - 5.1 mmol/L 3.9 3.7 3.9 3.9 Chloride Latest Ref Range: 97 - 108 mmol/L 106 106 105 108   CO2 Latest Ref Range: 21 - 32 mmol/L 27 26 26 28   Anion gap Latest Ref Range: 5 - 15 mmol/L 7 7 6 5   Glucose Latest Ref Range: 65 - 100 mg/dL 120 (H) 100 146 (H) 80   BUN Latest Ref Range: 6 - 20 MG/DL 19 16 16 20   Creatinine Latest Ref Range: 0.55 - 1.02 MG/DL 1.05 (H) 0.98 0.92 1.01   BUN/Creatinine ratio Latest Ref Range: 12 - 20   18 16 17 20   Calcium Latest Ref Range: 8.5 - 10.1 MG/DL 8.5 8.4 (L) 8.3 (L) 9.3   Phosphorus Latest Ref Range: 2.6 - 4.7 MG/DL 3.3 3.6 3.0 3.6   Magnesium Latest Ref Range: 1.6 - 2.4 mg/dL 2.0 2.2 2.2 2.2   GFR est non-AA Latest Ref Range: >60 ml/min/1.73m2 54 (L) 59 (L) >60 57 (L)   GFR est AA Latest Ref Range: >60 ml/min/1.73m2 >60 >60 >60 >60   Bilirubin, total Latest Ref Range: 0.2 - 1.0 MG/DL 0.5 0.5 0.6 0.5   Protein, total Latest Ref Range: 6.4 - 8.2 g/dL 7.0 7.3 7.6 7.6   Albumin Latest Ref Range: 3.5 - 5.0 g/dL 3.7 3.6 3.9 4.0   Globulin Latest Ref Range: 2.0 - 4.0 g/dL 3.3 3.7 3.7 3.6   A-G Ratio Latest Ref Range: 1.1 - 2.2   1.1 1.0 (L) 1.1 1.1   ALT (SGPT) Latest Ref Range: 12 - 78 U/L 28 23 27 21   AST Latest Ref Range: 15 - 37 U/L 24 16 22 16   Alk.  phosphatase Latest Ref Range: 45 - 117 U/L 48 44 (L) 51 45

## 2018-12-20 NOTE — PROGRESS NOTES
Lolis Munguia is a 52 y.o. female here today for follow up of metastatic breast cancer. States she would like Femara sent to [facility] delivery.

## 2018-12-20 NOTE — PROGRESS NOTES
[facility] VISIT NOTE    6246 Patient arrives for [facility] flush/labs/xgeva [id number] without acute problems. Please see connect care for complete assessment and education provided. All central lines follow the [facility]. *1415: Patient instructed to Dr. Tamy Amador office while awaiting lab results, and to return to [facility] for Xgeva if lab results within acceptable range. *1540: Patient returned from Dr. Tamy Amador office, Bhakti [place] shot given. Vital signs stable throughout and prior to discharge, Pt. Tolerated treatment well and discharged without incident. Patient/parent is aware of next [address] appointment on 1/23/2019. Appointment card given to patient/parents. Medications Verified by Cecelia Jackson RN & Shanthi Bah RN via [employer]:  1. Heparin 500 units  2. Xgeva 120mg SQ      VITAL SIGNS   Patient Vitals for the past 12 hrs:   Temp Pulse Resp BP SpO2   12/20/18 1407 98 °F (36.7 °C) 67 18 137/86 95 %     LAB WORK Lab results pending, please see Connect Care for results. Recent Results (from the past 12 hour(s))   CBC WITH AUTOMATED DIFF    Collection Time: 12/20/18  2:08 PM   Result Value Ref Range    WBC 3.2 (L) 3.6 - 11.0 K/uL    RBC 4.10 3.80 - 5.20 M/uL    HGB 12.4 11.5 - 16.0 g/dL    HCT 38.3 35.0 - 47.0 %    MCV 93.4 80.0 - 99.0 FL    MCH 30.2 26.0 - 34.0 PG    MCHC 32.4 30.0 - 36.5 g/dL    RDW 14.8 (H) 11.5 - 14.5 %    PLATELET 778 (L) 872 - 400 K/uL    MPV 11.1 8.9 - 12.9 FL    NRBC 0.0 0  WBC    ABSOLUTE NRBC 0.00 0.00 - 0.01 K/uL    NEUTROPHILS 42 32 - 75 %    LYMPHOCYTES 49 12 - 49 %    MONOCYTES 5 5 - 13 %    EOSINOPHILS 3 0 - 7 %    BASOPHILS 1 0 - 1 %    IMMATURE GRANULOCYTES 0 %    ABS. NEUTROPHILS 1.3 (L) 1.8 - 8.0 K/UL    ABS. LYMPHOCYTES 1.6 0.8 - 3.5 K/UL    ABS. MONOCYTES 0.2 0.0 - 1.0 K/UL    ABS. EOSINOPHILS 0.1 0.0 - 0.4 K/UL    ABS. BASOPHILS 0.0 0.0 - 0.1 K/UL    ABS. IMM.  GRANS. 0.0 K/UL    DF MANUAL      PLATELET COMMENTS Large Platelets RBC COMMENTS ANISOCYTOSIS  1+       METABOLIC PANEL, COMPREHENSIVE    Collection Time: 12/20/18  2:08 PM   Result Value Ref Range    Sodium 141 136 - 145 mmol/L    Potassium 3.9 3.5 - 5.1 mmol/L    Chloride 108 97 - 108 mmol/L    CO2 28 21 - 32 mmol/L    Anion gap 5 5 - 15 mmol/L    Glucose 80 65 - 100 mg/dL    BUN 20 6 - 20 MG/DL    Creatinine 1.01 0.55 - 1.02 MG/DL    BUN/Creatinine ratio 20 12 - 20      GFR est AA >60 >60 ml/min/1.73m2    GFR est non-AA 57 (L) >60 ml/min/1.73m2    Calcium 9.3 8.5 - 10.1 MG/DL    Bilirubin, total 0.5 0.2 - 1.0 MG/DL    ALT (SGPT) 21 12 - 78 U/L    AST (SGOT) 16 15 - 37 U/L    Alk.  phosphatase 45 45 - 117 U/L    Protein, total 7.6 6.4 - 8.2 g/dL    Albumin 4.0 3.5 - 5.0 g/dL    Globulin 3.6 2.0 - 4.0 g/dL    A-G Ratio 1.1 1.1 - 2.2     MAGNESIUM    Collection Time: 12/20/18  2:08 PM   Result Value Ref Range    Magnesium 2.2 1.6 - 2.4 mg/dL   PHOSPHORUS    Collection Time: 12/20/18  2:08 PM   Result Value Ref Range    Phosphorus 3.6 2.6 - 4.7 MG/DL

## 2018-12-20 NOTE — PROGRESS NOTES
[facility] [place] at [address], [address], [address] [place] [place]: [phone]  F: [phone]    Reason for Visit:   Palma Wheatley is a 52 y.o. female who is seen in consultation at the request of Dr. Rosamaria Cramer for evaluation of metastatic breast cancer. Treatment History:     Anastrozole/ ibrance/ [place]geva [place] 3/18 [place]    STAGE: dx 8/17 4 with L2 bone mets biopsied in [place]  Prior stage 1 RIGHT breast cancer 2016 lump/XRT tamoxifen    History of Present Illness:     Pt seen today for office for f/u met breast cancer on ibrance/ Marrie Figures / Maryellen Wray. Pt feeling well overall. CTs 11/18 here with nonspecific liver mets/ stable  Labs good on ibrance. Pt is working a lot. Renting an apt. Hx:   consult for metastatic breast cancer ER+ HER2 negative seen and treated in [place]. Pt initially dx in 2011 with ? Met breast cancer to liver. Had chemo IV then xeloda. Had lupron. Pt had mammo 2015 which was abnormal and then had breast biopsy and breast cancer. Then pt had breast surgery/ XRT. During this time pt states liver mets \"went away\". F/u visit 2017 showed bone met lumbar spine. Pt was on arimidex. Pt is currently on femara/ ibrance/ xgeva since 3/18. Here today to establish care. Last PET 3/18 negative except bone. Liver contour abnormalities thought to be cirrhosis. No labs sent from [place]. Pt has a port. Pt works for [employer]. Pt denies pain.      Past Medical History:   Diagnosis Date    Arthritis     [facility] [address]     Diabetes ([facility] [address]       Past Surgical History:   Procedure Laterality Date    BREAST SURGERY PROCEDURE UNLISTED      partial mascetomies    HX LAP CHOLECYSTECTOMY        Social History     Tobacco Use    Smoking status: Never Smoker    Smokeless tobacco: Never Used   Substance Use Topics    Alcohol use: Yes      Family History   Problem Relation Age of Onset    Cancer Mother    [employer] Arthritis-osteo Mother     Hypertension Mother  Heart Disease Father     Hypertension Sister     Hypertension Brother      Current Outpatient Medications   Medication Sig    aspirin delayed-release 81 mg tablet Take  by mouth daily.  letrozole (FEMARA) 2.5 mg tablet Take 1 Tab by mouth daily.  FEMARA 2.5 mg tablet TK 1 T PO QD    palbociclib 75 mg cap Take 1 capsule (75mg) daily for 21 days on followed by 7 days off    multivitamin (ONE A DAY) tablet multivitamin    B infantis/B ani/B ivory/B bifid (PROBIOTIC 4X PO) Probiotic    denosumab (XGEVA SC) Xgeva    metFORMIN (GLUCOPHAGE) 500 mg tablet Take  by mouth daily.  ascorbic acid, vitamin C, (VITAMIN C) 250 mg tablet Take 500 mg by mouth two (2) times a day.  calcium-cholecalciferol, D3, (CALTRATE 600+D) tablet Take 1 Tab by mouth daily. No current facility-administered medications for this visit. Facility-Administered Medications Ordered in Other Visits   Medication Dose Route Frequency    saline peripheral flush soln 10 mL  10 mL InterCATHeter PRN    sodium chloride 0.9% injection 10 mL  10 mL IntraVENous PRN    heparin (porcine) pf 300-500 Units  300-500 Units InterCATHeter PRN    denosumab (XGEVA) injection 120 mg  120 mg SubCUTAneous ONCE      Allergies   Allergen Reactions    Adhesive Rash        Review of Systems: A complete review of systems was obtained, negative except as described above. Physical Exam:     Visit Vitals  /84   Pulse 60   Temp 98.8 °F (37.1 °C) (Oral)   Resp 16   Ht 5' 4\" (1.626 m)   Wt 253 lb (114.8 kg)   SpO2 96%   BMI 43.43 kg/m²     ECOG PS: 0  General: No distress  Eyes: PERRLA, anicteric sclerae  HENT: Atraumatic, OP clear  Neck: Supple  Lymphatic: No cervical, supraclavicular  Respiratory: CTAB, normal respiratory effort  CV: Normal rate, regular rhythm, no murmurs, no peripheral edema  GI: Soft, nondistended  MS: Normal gait and station. Skin: No rashes, ecchymoses, or petechiae. Normal temperature, turgor, and texture.   Psych: Alert, oriented, appropriate affect, normal judgment/insight      Results:     Lab Results   Component Value Date/Time    WBC 3.2 (L) 12/20/2018 02:08 PM    HGB 12.4 12/20/2018 02:08 PM    HCT 38.3 12/20/2018 02:08 PM    PLATELET 166 (L) 07/12/3419 02:08 PM    MCV 93.4 12/20/2018 02:08 PM    ABS. NEUTROPHILS 1.3 (L) 12/20/2018 02:08 PM     Lab Results   Component Value Date/Time    Sodium 141 12/20/2018 02:08 PM    Potassium 3.9 12/20/2018 02:08 PM    Chloride 108 12/20/2018 02:08 PM    CO2 28 12/20/2018 02:08 PM    Glucose 80 12/20/2018 02:08 PM    BUN 20 12/20/2018 02:08 PM    Creatinine 1.01 12/20/2018 02:08 PM    GFR est AA >60 12/20/2018 02:08 PM    GFR est non-AA 57 (L) 12/20/2018 02:08 PM    Calcium 9.3 12/20/2018 02:08 PM     Lab Results   Component Value Date/Time    Bilirubin, total 0.5 12/20/2018 02:08 PM    ALT (SGPT) 21 12/20/2018 02:08 PM    AST (SGOT) 16 12/20/2018 02:08 PM    Alk. phosphatase 45 12/20/2018 02:08 PM    Protein, total 7.6 12/20/2018 02:08 PM    Albumin 4.0 12/20/2018 02:08 PM    Globulin 3.6 12/20/2018 02:08 PM       None in our system  Records reviewed and summarized above. Pathology report(s) reviewed above. Radiology report(s) reviewed above. Assessment/PLAN:     1)  Metastatic breast cancer ER+ HER2 negative to [place] and liver from [place]. Prior stage 1 breast cancer 2011 and 2016. Pt reports prior hx of liver mets with hx of chemo IV and xeloda. No complete records from [place] onocology. Pt is a good historian. No evidence of liver mets now per pt. Last PET 3/18 negative except for bone mets. Pt is doing well on current regimen of femara/ ibrance/ xgeva. CTs 11/18  here show nonspecific liver disease. Present on prior CTs. Will continue labs with PF. Labs stable. Pt clinically has no pain and is doing well overall. 2) hx of reported liver mets with changes on CT chronically. CT here with possible liver lesion 8mm. monitor. 3)  Possible preDM.   Pt needs PCP here and is looking. 4) pt works for [employer]. Moving back here from [place]. Stress due to work/ move. F/u here in a month at [facility]/ PF/ labs. Call if questions. I appreciate the opportunity to participate in [address]. [address] [facility].     Signed By: Alejandra Montaño,

## 2018-12-21 LAB — CANCER AG27-29 SERPL-ACNC: 26.7 U/ML (ref 0–38.6)

## 2018-12-26 ENCOUNTER — HOSPITAL ENCOUNTER (OUTPATIENT)
Dept: INFUSION THERAPY | Age: 53
End: 2018-12-26
Payer: COMMERCIAL

## 2019-01-16 RX ORDER — SODIUM CHLORIDE 0.9 % (FLUSH) 0.9 %
10 SYRINGE (ML) INJECTION AS NEEDED
Status: CANCELLED
Start: 2019-01-23

## 2019-01-16 RX ORDER — SODIUM CHLORIDE 9 MG/ML
10 INJECTION INTRAMUSCULAR; INTRAVENOUS; SUBCUTANEOUS AS NEEDED
Status: CANCELLED | OUTPATIENT
Start: 2019-01-23

## 2019-01-16 RX ORDER — HEPARIN 100 UNIT/ML
300-500 SYRINGE INTRAVENOUS AS NEEDED
Status: CANCELLED
Start: 2019-01-23

## 2019-01-24 ENCOUNTER — HOSPITAL ENCOUNTER (OUTPATIENT)
Dept: INFUSION THERAPY | Age: 54
Discharge: HOME | End: 2019-01-24
Payer: COMMERCIAL

## 2019-01-24 VITALS
SYSTOLIC BLOOD PRESSURE: 141 MMHG | OXYGEN SATURATION: 96 % | DIASTOLIC BLOOD PRESSURE: 91 MMHG | HEART RATE: 65 BPM | RESPIRATION RATE: 16 BRPM | TEMPERATURE: 98.4 F

## 2019-01-24 DIAGNOSIS — C50.919 METASTATIC BREAST CANCER (HCC): Primary | ICD-10-CM

## 2019-01-24 DIAGNOSIS — C79.51 CANCER, METASTATIC TO BONE (HCC): ICD-10-CM

## 2019-01-24 LAB
ALBUMIN SERPL-MCNC: 4 G/DL (ref 3.5–5)
ALBUMIN/GLOB SERPL: 1.1 {RATIO} (ref 1.1–2.2)
ALP SERPL-CCNC: 52 U/L (ref 45–117)
ALT SERPL-CCNC: 26 U/L (ref 12–78)
ANION GAP SERPL CALC-SCNC: 5 MMOL/L (ref 5–15)
AST SERPL-CCNC: 29 U/L (ref 15–37)
BASOPHILS # BLD: 0.1 K/UL (ref 0–0.1)
BASOPHILS NFR BLD: 2 % (ref 0–1)
BILIRUB SERPL-MCNC: 0.5 MG/DL (ref 0.2–1)
BUN SERPL-MCNC: 20 MG/DL (ref 6–20)
BUN/CREAT SERPL: 21 (ref 12–20)
CALCIUM SERPL-MCNC: 8.7 MG/DL (ref 8.5–10.1)
CHLORIDE SERPL-SCNC: 107 MMOL/L (ref 97–108)
CO2 SERPL-SCNC: 28 MMOL/L (ref 21–32)
CREAT SERPL-MCNC: 0.96 MG/DL (ref 0.55–1.02)
DIFFERENTIAL METHOD BLD: ABNORMAL
EOSINOPHIL # BLD: 0.1 K/UL (ref 0–0.4)
EOSINOPHIL NFR BLD: 3 % (ref 0–7)
ERYTHROCYTE [DISTWIDTH] IN BLOOD BY AUTOMATED COUNT: 15.4 % (ref 11.5–14.5)
GLOBULIN SER CALC-MCNC: 3.5 G/DL (ref 2–4)
GLUCOSE SERPL-MCNC: 138 MG/DL (ref 65–100)
HCT VFR BLD AUTO: 39 % (ref 35–47)
HGB BLD-MCNC: 13.2 G/DL (ref 11.5–16)
IMM GRANULOCYTES # BLD AUTO: 0 K/UL (ref 0–0.04)
IMM GRANULOCYTES NFR BLD AUTO: 0 % (ref 0–0.5)
LYMPHOCYTES # BLD: 1.6 K/UL (ref 0.8–3.5)
LYMPHOCYTES NFR BLD: 54 % (ref 12–49)
MAGNESIUM SERPL-MCNC: 2.4 MG/DL (ref 1.6–2.4)
MCH RBC QN AUTO: 31.7 PG (ref 26–34)
MCHC RBC AUTO-ENTMCNC: 33.8 G/DL (ref 30–36.5)
MCV RBC AUTO: 93.8 FL (ref 80–99)
MONOCYTES # BLD: 0.2 K/UL (ref 0–1)
MONOCYTES NFR BLD: 7 % (ref 5–13)
NEUTS SEG # BLD: 1 K/UL (ref 1.8–8)
NEUTS SEG NFR BLD: 34 % (ref 32–75)
NRBC # BLD: 0 K/UL (ref 0–0.01)
NRBC BLD-RTO: 0 PER 100 WBC
PHOSPHATE SERPL-MCNC: 3.4 MG/DL (ref 2.6–4.7)
PLATELET # BLD AUTO: 167 K/UL (ref 150–400)
PMV BLD AUTO: 11.5 FL (ref 8.9–12.9)
POTASSIUM SERPL-SCNC: 4 MMOL/L (ref 3.5–5.1)
PROT SERPL-MCNC: 7.5 G/DL (ref 6.4–8.2)
RBC # BLD AUTO: 4.16 M/UL (ref 3.8–5.2)
SODIUM SERPL-SCNC: 140 MMOL/L (ref 136–145)
WBC # BLD AUTO: 3 K/UL (ref 3.6–11)

## 2019-01-24 PROCEDURE — 84100 ASSAY OF PHOSPHORUS: CPT

## 2019-01-24 PROCEDURE — 86300 IMMUNOASSAY TUMOR CA 15-3: CPT

## 2019-01-24 PROCEDURE — 85025 COMPLETE CBC W/AUTO DIFF WBC: CPT

## 2019-01-24 PROCEDURE — 74011000250 HC RX REV CODE- 250: Performed by: REGISTERED NURSE

## 2019-01-24 PROCEDURE — 77030012965 HC NDL HUBR BBMI -A

## 2019-01-24 PROCEDURE — 36415 COLL VENOUS BLD VENIPUNCTURE: CPT

## 2019-01-24 PROCEDURE — 83735 ASSAY OF MAGNESIUM: CPT

## 2019-01-24 PROCEDURE — 74011250636 HC RX REV CODE- 250/636: Performed by: REGISTERED NURSE

## 2019-01-24 PROCEDURE — 96372 THER/PROPH/DIAG INJ SC/IM: CPT

## 2019-01-24 PROCEDURE — 96523 IRRIG DRUG DELIVERY DEVICE: CPT

## 2019-01-24 PROCEDURE — 80053 COMPREHEN METABOLIC PANEL: CPT

## 2019-01-24 RX ORDER — SODIUM CHLORIDE 9 MG/ML
10 INJECTION INTRAMUSCULAR; INTRAVENOUS; SUBCUTANEOUS AS NEEDED
Status: DISPENSED | OUTPATIENT
Start: 2019-01-24 | End: 2019-01-25

## 2019-01-24 RX ORDER — SODIUM CHLORIDE 0.9 % (FLUSH) 0.9 %
10 SYRINGE (ML) INJECTION AS NEEDED
Status: DISPENSED | OUTPATIENT
Start: 2019-01-24 | End: 2019-01-25

## 2019-01-24 RX ORDER — HEPARIN 100 UNIT/ML
300-500 SYRINGE INTRAVENOUS AS NEEDED
Status: DISPENSED | OUTPATIENT
Start: 2019-01-24 | End: 2019-01-25

## 2019-01-24 RX ADMIN — SODIUM CHLORIDE, PRESERVATIVE FREE 500 UNITS: 5 INJECTION INTRAVENOUS at 13:50

## 2019-01-24 RX ADMIN — DENOSUMAB 120 MG: 120 INJECTION SUBCUTANEOUS at 14:53

## 2019-01-24 RX ADMIN — Medication 10 ML: at 13:50

## 2019-01-24 RX ADMIN — SODIUM CHLORIDE 10 ML: 9 INJECTION, SOLUTION INTRAMUSCULAR; INTRAVENOUS; SUBCUTANEOUS at 13:50

## 2019-01-24 NOTE — PROGRESS NOTES
PEDI [facility] VISIT NOTE      7732 ZDHRVML arrives for Xgeva/Port Flush without acute problems. Please see connect care for complete assessment and education provided.      LCW port accessed without difficulty with 0.75 hunt needle; + blood return noted; labs drawn and sent for processing.      Vitals Signs:  Patient Vitals for the past 12 hrs:   Temp Pulse Resp BP SpO2   01/24/19 1342 98.4 °F (36.9 °C) 65 16 (!) 141/91 96 %          Recent Results (from the past 12 hour(s))   CBC WITH AUTOMATED DIFF    Collection Time: 01/24/19  1:41 PM   Result Value Ref Range    WBC 3.0 (L) 3.6 - 11.0 K/uL    RBC 4.16 3.80 - 5.20 M/uL    HGB 13.2 11.5 - 16.0 g/dL    HCT 39.0 35.0 - 47.0 %    MCV 93.8 80.0 - 99.0 FL    MCH 31.7 26.0 - 34.0 PG    MCHC 33.8 30.0 - 36.5 g/dL    RDW 15.4 (H) 11.5 - 14.5 %    PLATELET 894 187 - 183 K/uL    MPV 11.5 8.9 - 12.9 FL    NRBC 0.0 0  WBC    ABSOLUTE NRBC 0.00 0.00 - 0.01 K/uL    NEUTROPHILS 34 32 - 75 %    LYMPHOCYTES 54 (H) 12 - 49 %    MONOCYTES 7 5 - 13 %    EOSINOPHILS 3 0 - 7 %    BASOPHILS 2 (H) 0 - 1 %    IMMATURE GRANULOCYTES 0 0.0 - 0.5 %    ABS. NEUTROPHILS 1.0 (L) 1.8 - 8.0 K/UL    ABS. LYMPHOCYTES 1.6 0.8 - 3.5 K/UL    ABS. MONOCYTES 0.2 0.0 - 1.0 K/UL    ABS. EOSINOPHILS 0.1 0.0 - 0.4 K/UL    ABS. BASOPHILS 0.1 0.0 - 0.1 K/UL    ABS. IMM.  GRANS. 0.0 0.00 - 0.04 K/UL    DF AUTOMATED     METABOLIC PANEL, COMPREHENSIVE    Collection Time: 01/24/19  1:41 PM   Result Value Ref Range    Sodium 140 136 - 145 mmol/L    Potassium 4.0 3.5 - 5.1 mmol/L    Chloride 107 97 - 108 mmol/L    CO2 28 21 - 32 mmol/L    Anion gap 5 5 - 15 mmol/L    Glucose 138 (H) 65 - 100 mg/dL    BUN 20 6 - 20 MG/DL    Creatinine 0.96 0.55 - 1.02 MG/DL    BUN/Creatinine ratio 21 (H) 12 - 20      GFR est AA >60 >60 ml/min/1.73m2    GFR est non-AA >60 >60 ml/min/1.73m2    Calcium 8.7 8.5 - 10.1 MG/DL    Bilirubin, total 0.5 0.2 - 1.0 MG/DL    ALT (SGPT) 26 12 - 78 U/L    AST (SGOT) 29 15 - 37 U/L    Alk. phosphatase 52 45 - 117 U/L    Protein, total 7.5 6.4 - 8.2 g/dL    Albumin 4.0 3.5 - 5.0 g/dL    Globulin 3.5 2.0 - 4.0 g/dL    A-G Ratio 1.1 1.1 - 2.2     MAGNESIUM    Collection Time: 01/24/19  1:41 PM   Result Value Ref Range    Magnesium 2.4 1.6 - 2.4 mg/dL   PHOSPHORUS    Collection Time: 01/24/19  1:41 PM   Result Value Ref Range    Phosphorus 3.4 2.6 - 4.7 MG/DL     Patient's labs within parameters for treatment.     Medications:  Verified by Samir Vallejo RN via [employer]  1. Xgeva SQ     Patient's Port was flushed, heparinized; and de-accessed per protocol removed and bandage placed over site. 1500 Vital signs stable throughout and prior to discharge, Patient tolerated treatment well and discharged without incident.  Patient/parent is aware of next [facility] appointment on 2/20/2019 @ 11:00am.

## 2019-01-24 NOTE — PROGRESS NOTES
Problem: Knowledge Deficit  Goal: *Verbalizes understanding of procedures and medications  Outcome: Progressing Towards Goal  Patient here for Xgeva/Port Flush

## 2019-01-25 LAB — CANCER AG27-29 SERPL-ACNC: 33.8 U/ML (ref 0–38.6)

## 2019-02-04 ENCOUNTER — TELEPHONE (OUTPATIENT)
Dept: ONCOLOGY | Age: 54
End: 2019-02-04

## 2019-02-04 NOTE — TELEPHONE ENCOUNTER
HIPAA verified. Stated noticed  some bright red blood from anal area over past few days. Hx of anal fissures, but stated does not feel like this. Stated not associated with having bowel movement more noted with urination. Stated to see new PCP this week. Stated doing ok on Ibrance, but is fatigued. Last labs done 1/24 and forwarded to provider for review. RN advised any increase in bleeding may need to proceed to ED. Patient verbalized understanding and thanked for call.

## 2019-02-04 NOTE — TELEPHONE ENCOUNTER
Patient called and stated she has a appointment to come in and see Dr. Dilcia Porter on the 20th but something has come up and she would like a callback to discuss if she should wait that long or go see another doctor.     [phone]

## 2019-02-05 ENCOUNTER — TELEPHONE (OUTPATIENT)
Dept: ONCOLOGY | Age: 54
End: 2019-02-05

## 2019-02-05 NOTE — TELEPHONE ENCOUNTER
Call placed to patient. HIPAA verified. Advised of provider note. Patient verbalized concern. States rectal bleeding every day for past week. States this is mostly when she wipes after bowel movement but has noticed occasional bright red blood in toilet. She agrees to follow up with PCP. States appointment is next week. She is aware to seek urgent care if symptoms worsen.

## 2019-02-13 RX ORDER — SODIUM CHLORIDE 9 MG/ML
10 INJECTION INTRAMUSCULAR; INTRAVENOUS; SUBCUTANEOUS AS NEEDED
Status: CANCELLED | OUTPATIENT
Start: 2019-02-20

## 2019-02-13 RX ORDER — SODIUM CHLORIDE 0.9 % (FLUSH) 0.9 %
10 SYRINGE (ML) INJECTION AS NEEDED
Status: CANCELLED
Start: 2019-02-20

## 2019-02-13 RX ORDER — HEPARIN 100 UNIT/ML
300-500 SYRINGE INTRAVENOUS AS NEEDED
Status: CANCELLED
Start: 2019-02-20

## 2019-02-20 ENCOUNTER — OFFICE VISIT (OUTPATIENT)
Dept: ONCOLOGY | Age: 54
End: 2019-02-20

## 2019-02-20 ENCOUNTER — HOSPITAL ENCOUNTER (OUTPATIENT)
Dept: INFUSION THERAPY | Age: 54
Discharge: HOME | End: 2019-02-20
Payer: COMMERCIAL

## 2019-02-20 VITALS
OXYGEN SATURATION: 97 % | HEART RATE: 63 BPM | RESPIRATION RATE: 14 BRPM | TEMPERATURE: 97.8 F | SYSTOLIC BLOOD PRESSURE: 152 MMHG | DIASTOLIC BLOOD PRESSURE: 84 MMHG

## 2019-02-20 VITALS
WEIGHT: 251 LBS | RESPIRATION RATE: 16 BRPM | DIASTOLIC BLOOD PRESSURE: 83 MMHG | BODY MASS INDEX: 42.85 KG/M2 | HEIGHT: 64 IN | OXYGEN SATURATION: 96 % | SYSTOLIC BLOOD PRESSURE: 129 MMHG | TEMPERATURE: 98.5 F | HEART RATE: 54 BPM

## 2019-02-20 DIAGNOSIS — C50.919 METASTATIC BREAST CANCER (HCC): Primary | ICD-10-CM

## 2019-02-20 DIAGNOSIS — Z51.81 THERAPEUTIC DRUG MONITORING: ICD-10-CM

## 2019-02-20 DIAGNOSIS — C79.51 CANCER, METASTATIC TO BONE (HCC): ICD-10-CM

## 2019-02-20 DIAGNOSIS — D70.2 DRUG-INDUCED LEUKOPENIA (HCC): ICD-10-CM

## 2019-02-20 DIAGNOSIS — C78.7 LIVER METASTASES (HCC): ICD-10-CM

## 2019-02-20 LAB
ALBUMIN SERPL-MCNC: 4 G/DL (ref 3.5–5)
ALBUMIN/GLOB SERPL: 1.1 {RATIO} (ref 1.1–2.2)
ALP SERPL-CCNC: 43 U/L (ref 45–117)
ALT SERPL-CCNC: 23 U/L (ref 12–78)
ANION GAP SERPL CALC-SCNC: 8 MMOL/L (ref 5–15)
AST SERPL-CCNC: 19 U/L (ref 15–37)
BASOPHILS # BLD: 0.1 K/UL (ref 0–0.1)
BASOPHILS NFR BLD: 3 % (ref 0–1)
BILIRUB SERPL-MCNC: 0.6 MG/DL (ref 0.2–1)
BUN SERPL-MCNC: 20 MG/DL (ref 6–20)
BUN/CREAT SERPL: 20 (ref 12–20)
CALCIUM SERPL-MCNC: 9.8 MG/DL (ref 8.5–10.1)
CHLORIDE SERPL-SCNC: 106 MMOL/L (ref 97–108)
CO2 SERPL-SCNC: 26 MMOL/L (ref 21–32)
CREAT SERPL-MCNC: 1.01 MG/DL (ref 0.55–1.02)
DIFFERENTIAL METHOD BLD: ABNORMAL
EOSINOPHIL # BLD: 0.1 K/UL (ref 0–0.4)
EOSINOPHIL NFR BLD: 3 % (ref 0–7)
ERYTHROCYTE [DISTWIDTH] IN BLOOD BY AUTOMATED COUNT: 14.8 % (ref 11.5–14.5)
GLOBULIN SER CALC-MCNC: 3.6 G/DL (ref 2–4)
GLUCOSE SERPL-MCNC: 96 MG/DL (ref 65–100)
HCT VFR BLD AUTO: 38.9 % (ref 35–47)
HGB BLD-MCNC: 13 G/DL (ref 11.5–16)
IMM GRANULOCYTES # BLD AUTO: 0 K/UL
IMM GRANULOCYTES NFR BLD AUTO: 0 %
LYMPHOCYTES # BLD: 1.7 K/UL (ref 0.8–3.5)
LYMPHOCYTES NFR BLD: 53 % (ref 12–49)
MAGNESIUM SERPL-MCNC: 2.3 MG/DL (ref 1.6–2.4)
MCH RBC QN AUTO: 31.1 PG (ref 26–34)
MCHC RBC AUTO-ENTMCNC: 33.4 G/DL (ref 30–36.5)
MCV RBC AUTO: 93.1 FL (ref 80–99)
MONOCYTES # BLD: 0.2 K/UL (ref 0–1)
MONOCYTES NFR BLD: 6 % (ref 5–13)
NEUTS SEG # BLD: 1.1 K/UL (ref 1.8–8)
NEUTS SEG NFR BLD: 35 % (ref 32–75)
NRBC # BLD: 0 K/UL (ref 0–0.01)
NRBC BLD-RTO: 0 PER 100 WBC
PHOSPHATE SERPL-MCNC: 4.2 MG/DL (ref 2.6–4.7)
PLATELET # BLD AUTO: 126 K/UL (ref 150–400)
PMV BLD AUTO: 11.1 FL (ref 8.9–12.9)
POTASSIUM SERPL-SCNC: 4.1 MMOL/L (ref 3.5–5.1)
PROT SERPL-MCNC: 7.6 G/DL (ref 6.4–8.2)
RBC # BLD AUTO: 4.18 M/UL (ref 3.8–5.2)
RBC MORPH BLD: ABNORMAL
SODIUM SERPL-SCNC: 140 MMOL/L (ref 136–145)
WBC # BLD AUTO: 3.2 K/UL (ref 3.6–11)

## 2019-02-20 PROCEDURE — 80053 COMPREHEN METABOLIC PANEL: CPT

## 2019-02-20 PROCEDURE — 74011250636 HC RX REV CODE- 250/636: Performed by: NURSE PRACTITIONER

## 2019-02-20 PROCEDURE — 86300 IMMUNOASSAY TUMOR CA 15-3: CPT

## 2019-02-20 PROCEDURE — 83735 ASSAY OF MAGNESIUM: CPT

## 2019-02-20 PROCEDURE — 74011000250 HC RX REV CODE- 250: Performed by: INTERNAL MEDICINE

## 2019-02-20 PROCEDURE — 84100 ASSAY OF PHOSPHORUS: CPT

## 2019-02-20 PROCEDURE — 74011250636 HC RX REV CODE- 250/636: Performed by: INTERNAL MEDICINE

## 2019-02-20 PROCEDURE — 77030012965 HC NDL HUBR BBMI -A

## 2019-02-20 PROCEDURE — 36591 DRAW BLOOD OFF VENOUS DEVICE: CPT

## 2019-02-20 PROCEDURE — 85025 COMPLETE CBC W/AUTO DIFF WBC: CPT

## 2019-02-20 PROCEDURE — 96372 THER/PROPH/DIAG INJ SC/IM: CPT

## 2019-02-20 PROCEDURE — 36415 COLL VENOUS BLD VENIPUNCTURE: CPT

## 2019-02-20 RX ORDER — SODIUM CHLORIDE 0.9 % (FLUSH) 0.9 %
10 SYRINGE (ML) INJECTION AS NEEDED
Status: ACTIVE | OUTPATIENT
Start: 2019-02-20 | End: 2019-02-20

## 2019-02-20 RX ORDER — HEPARIN 100 UNIT/ML
300-500 SYRINGE INTRAVENOUS AS NEEDED
Status: ACTIVE | OUTPATIENT
Start: 2019-02-20 | End: 2019-02-20

## 2019-02-20 RX ORDER — HEPARIN 100 UNIT/ML
500 SYRINGE INTRAVENOUS AS NEEDED
Status: DISPENSED | OUTPATIENT
Start: 2019-02-20 | End: 2019-02-21

## 2019-02-20 RX ORDER — SODIUM CHLORIDE 9 MG/ML
10 INJECTION INTRAMUSCULAR; INTRAVENOUS; SUBCUTANEOUS AS NEEDED
Status: ACTIVE | OUTPATIENT
Start: 2019-02-20 | End: 2019-02-20

## 2019-02-20 RX ORDER — SODIUM CHLORIDE 0.9 % (FLUSH) 0.9 %
5-10 SYRINGE (ML) INJECTION AS NEEDED
Status: DISPENSED | OUTPATIENT
Start: 2019-02-20 | End: 2019-02-21

## 2019-02-20 RX ORDER — ZINC GLUCONATE 10 MG
LOZENGE ORAL DAILY
COMMUNITY

## 2019-02-20 RX ORDER — SODIUM CHLORIDE 9 MG/ML
10 INJECTION INTRAMUSCULAR; INTRAVENOUS; SUBCUTANEOUS AS NEEDED
Status: DISPENSED | OUTPATIENT
Start: 2019-02-20 | End: 2019-02-21

## 2019-02-20 RX ADMIN — Medication 10 ML: at 11:15

## 2019-02-20 RX ADMIN — DENOSUMAB 120 MG: 120 INJECTION SUBCUTANEOUS at 12:35

## 2019-02-20 RX ADMIN — SODIUM CHLORIDE 10 ML: 9 INJECTION, SOLUTION INTRAMUSCULAR; INTRAVENOUS; SUBCUTANEOUS at 11:09

## 2019-02-20 RX ADMIN — SODIUM CHLORIDE, PRESERVATIVE FREE 500 UNITS: 5 INJECTION INTRAVENOUS at 11:09

## 2019-02-20 NOTE — PROGRESS NOTES
[facility] [place] [address] [address] W: [phone]  F: [phone] for Visit:  
Anne Akers is a 52 y.o. female who is seen for follow up of metastatic breast cancer on Ibrance/Femara/Xgeva. Treatment History: Anastrozole/Ibrance/Xgeva [place] 3/18 in [place] 
STAGE: dx 8/17 4 with L2 bone mets biopsied in [place] Prior stage 1 RIGHT breast cancer 2016 lump/XRT tamoxifen History of Present Illness:  
 
Patient seen today for office for f/u metastatic breast cancer on Ibrance/Femara/Xgeva. Patient is feeling well overall. Tolerating ibrance well. Finished cycle of Ibrance yesterday, will restart next Tuesday. CTs 11/18 here with nonspecific liver mets/ stable. Labs have been good/stable on Ibrance. Patient had several episodes of blood with wiping with urination/bowel movements. Did not visualize blood in stool. This lasted at least a week. Patient saw PCP and is doing at home fecal testing. She goes back to PCP on 2/15/19. Patient is working a lot, retiring at the end of this month. She reports that her appetite is good. Patient denies fever, chills, cough, shortness of breath, chest pain, nausea, vomiting, diarrhea, constipation, and abdominal pain. Labs pending today. Here alone today. Oncologic Hx: 
Metastatic breast cancer ER+ HER2 negative seen and treated in [place]. Patient initially dx in 2011 with ? Met breast cancer to liver. Had chemo IV then Xeloda. Had Lupron. Patient had mammo 2015 which was abnormal and then had breast biopsy and breast cancer. Then patient had breast surgery/ XRT. During this time patient states liver mets \"went away\". F/u visit 2017 showed bone met lumbar spine. Patient was on Arimidex. Patient is currently on Femara/Ibrance/Xgeva since 3/18. Last PET 3/18 negative except bone. Liver contour abnormalities thought to be cirrhosis. Patient has a port. Past Medical History:  
Diagnosis Date  Arthritis  Cancer ([facility] [address]  Diabetes ([facility] [address] [facility] Past Surgical History:  
Procedure Laterality Date  BREAST SURGERY PROCEDURE UNLISTED    
 partial mascetomies  HX LAP CHOLECYSTECTOMY Social History Tobacco Use  Smoking status: Never Smoker  Smokeless tobacco: Never Used Substance Use Topics  Alcohol use: Yes Family History Problem Relation Age of Onset  Cancer Mother [facility] Arthritis-osteo Mother  Hypertension Mother  Heart Disease Father  Hypertension Sister  Hypertension Brother Current Outpatient Medications Medication Sig  
 magnesium 250 mg tab Take  by mouth daily.  aspirin delayed-release 81 mg tablet Take  by mouth daily.  letrozole (FEMARA) 2.5 mg tablet Take 1 Tab by mouth daily.  palbociclib 75 mg cap Take 1 capsule (75mg) daily for 21 days on followed by 7 days off  multivitamin (ONE A DAY) tablet multivitamin  B infantis/B ani/B ivory/B bifid (PROBIOTIC 4X PO) Probiotic  denosumab (Steele Jaison [place]) Tiffanie Horsfall  metFORMIN (GLUCOPHAGE) 500 mg tablet Take  by mouth daily.  ascorbic acid, vitamin C, (VITAMIN C) 250 mg tablet Take 500 mg by mouth two (2) times a day.  calcium-cholecalciferol, D3, (CALTRATE 600+D) tablet Take 1 Tab by mouth daily.  FEMARA 2.5 mg tablet TK 1 T PO QD No current facility-administered medications for this visit. Facility-Administered Medications Ordered in Other Visits Medication Dose Route Frequency  heparin (porcine) pf 500 Units  500 Units IntraVENous PRN  
 sodium chloride (NS) flush 5-10 mL  5-10 mL IntraVENous PRN  
 sodium chloride 0.9% injection 10 mL  10 mL IntraVENous PRN  
 saline peripheral flush soln 10 mL  10 mL InterCATHeter PRN  
 sodium chloride 0.9% injection 10 mL  10 mL IntraVENous PRN  
 heparin (porcine) pf 300-500 Units  300-500 Units InterCATHeter PRN Allergies Allergen Reactions  Adhesive Rash Review of Systems: A complete review of systems was obtained, negative except as described above. Physical Exam:  
 
Visit Vitals /83 Pulse (!) 54 Temp 98.5 °F (36.9 °C) (Oral) Resp 16 Ht 5' 4\" (1.626 m) Wt 251 lb (113.9 kg) SpO2 96% BMI 43.08 kg/m² ECOG PS: 0 General: No distress Eyes: PERRLA, anicteric sclerae HENT: Atraumatic, OP clear Neck: Supple Lymphatic: No cervical, supraclavicular Respiratory: CTAB, normal respiratory effort CV: Normal rate, regular rhythm, no murmurs, no peripheral edema GI: Soft, nondistended MS: Normal gait and station. Skin: No rashes, ecchymoses, or petechiae. Normal temperature, turgor, and texture. Psych: Alert, oriented, appropriate affect, normal judgment/insight Results:  
 
Lab Results Component Value Date/Time WBC 3.2 (L) 02/20/2019 11:06 AM  
 HGB 13.0 02/20/2019 11:06 AM  
 HCT 38.9 02/20/2019 11:06 AM  
 PLATELET 880 (L) 54/44/7068 11:06 AM  
 MCV 93.1 02/20/2019 11:06 AM  
 ABS. NEUTROPHILS 1.1 (L) 02/20/2019 11:06 AM  
 
Lab Results Component Value Date/Time Sodium 140 02/20/2019 11:06 AM  
 Potassium 4.1 02/20/2019 11:06 AM  
 Chloride 106 02/20/2019 11:06 AM  
 CO2 26 02/20/2019 11:06 AM  
 Glucose 96 02/20/2019 11:06 AM  
 BUN 20 02/20/2019 11:06 AM  
 Creatinine 1.01 02/20/2019 11:06 AM  
 GFR est AA >60 02/20/2019 11:06 AM  
 GFR est non-AA 57 (L) 02/20/2019 11:06 AM  
 Calcium 9.8 02/20/2019 11:06 AM  
 
Lab Results Component Value Date/Time Bilirubin, total 0.6 02/20/2019 11:06 AM  
 ALT (SGPT) 23 02/20/2019 11:06 AM  
 AST (SGOT) 19 02/20/2019 11:06 AM  
 Alk. phosphatase 43 (L) 02/20/2019 11:06 AM  
 Protein, total 7.6 02/20/2019 11:06 AM  
 Albumin 4.0 02/20/2019 11:06 AM  
 Globulin 3.6 02/20/2019 11:06 AM  
 
CT Results (most recent): 
Results from Hospital Encounter encounter on 11/07/18 CT ABD PELV W CONT  Addendum Addendum: Comparison was made to a PET/CT dated 3/9/2018 and abdominal CT  dated 2/9/2018 from 1900 MD Kristyn 11/20/2018  5:00 PM        
 Narrative EXAM:  CT CHEST W CONT, CT ABD PELV W CONT INDICATION: restaging metastatic breast cancer COMPARISON: 2018 CONTRAST:  100 mL of Isovue-370. TECHNIQUE:  
Following the uneventful intravenous administration of contrast, thin axial 
images were obtained through the chest, abdomen and pelvis. Coronal and sagittal 
reconstructions were generated. Oral contrast was not administered. CT dose 
reduction was achieved through use of a standardized protocol tailored for this 
examination and automatic exposure control for dose modulation. FINDINGS:  
Postoperative changes are noted in the right breast. 
THYROID: No nodule. MEDIASTINUM: 1.4 cm pretracheal lymph node is stable. ABDON: No mass or lymphadenopathy. THORACIC AORTA: No dissection or aneurysm. MAIN PULMONARY ARTERY: Normal in caliber. TRACHEA/BRONCHI: Patent. ESOPHAGUS: No wall thickening or dilatation. HEART: Normal in size. PLEURA: No effusion or pneumothorax. LUNGS: 2 mm nodule at the right lung base is stable. No left pulmonary nodules 
identified. LIVER: Lobulation of the liver most consistent with cirrhosis is unchanged. There is questionable 8mm low density in the liver just beneath the dome of the 
diaphragm. GALLBLADDER: Surgically absent SPLEEN: No mass. PANCREAS: No mass or ductal dilatation. ADRENALS: Unremarkable. KIDNEYS: No mass, calculus, or hydronephrosis. STOMACH: Unremarkable. SMALL BOWEL: No dilatation or wall thickening. COLON: No dilatation or wall thickening. APPENDIX: Unremarkable. PERITONEUM: No ascites or pneumoperitoneum. RETROPERITONEUM: No lymphadenopathy or aortic aneurysm. REPRODUCTIVE ORGANS: The uterus is lobulated with multiple masses again noted 
consistent with multiple leiomyomata. There is a 2.4 x 3.9 cm low-density centrally could represent prominent endometrium. CT dated 2/9/2018 did not have 
images of this area URINARY BLADDER: No mass or calculus. BONES: Sclerotic lesion L1 is unchanged ADDITIONAL COMMENTS: N/A Impression IMPRESSION: 
CT the chest demonstrates no definite evidence of metastatic disease. Postoperative changes are noted right breast are stable. . 2 mm nodule right lung 
base is unchanged. Sclerotic lesion at L1 is unchanged. Liver is irregular in contour suspicious for cirrhosis which is unchanged. There 
is an 8 mm vague low-density in the liver just at the dome of diaphragm segment 8 which is equivocal and follow-up studies would be helpful to assess for 
interval change. Lobular contour of the uterus with multiple mass lesions consistent with 
leiomyomata are again noted. There is low-density centrally measuring 2.4 x 4 cm 
within the uterus which could represent prominent endometrium versus low-density 
leiomyoma. No images of the pelvis from the CT dated 2/9/2018 are included. Records reviewed and summarized above. Pathology report(s) reviewed above. Radiology report(s) reviewed above. Assessment/PLAN:  
 
1)  Metastatic breast cancer ER+ HER2 negative to bones and liver from [place]. Prior stage 1 breast cancer 2011 and 2016. Patient reports prior hx of liver mets with hx of chemo IV and Xeloda. No complete records from [place] onocology. Patient is a good historian. No evidence of liver mets now per patient. Last PET 3/18 negative except for bone mets. Patient is doing well on current regimen of Femara/Ibrance/Xgeva. Tolerating regimen well. CTs 11/18  here show nonspecific liver disease. Present on prior CTs. Patient clinically has no pain and is doing well overall. Will continue labs with PF. Labs pending today. Will order repeat CTs today to evaluate response to therapy. 2) Hx of reported liver mets with changes on CT chronically CT here with possible liver lesion 8mm. Will continue to monitor. CTs ordered today. 3)  Possible preDM Management per PCP. 4) Psychosocial 
Patientworks for [employer]. She is retiring at the end of the month and excited about this. Call if questions. F/u here in 2 months. I appreciate the opportunity to participate in Ms. [address] [facility] and the [facility]. This patient was seen in conjunction with Tate Haddad NP.  
 
 
Signed By: Matry Quan DO

## 2019-02-20 NOTE — LETTER
2/20/2019 5:07 PM 
 
Patient:  Citlali Mittal YOB: 1965 Date of Visit: 2/20/2019 Dear No Recipients: Thank you for referring Ms. Ariel Byrd to me for evaluation/treatment. Below are the relevant portions of my assessment and plan of care. If you have questions, please do not hesitate to call me. I look forward to following Ms. Trupti Adams along with you. Sincerely, Esthela Henley NP

## 2019-02-21 LAB — CANCER AG27-29 SERPL-ACNC: 37.4 U/ML (ref 0–38.6)

## 2019-02-22 NOTE — PROGRESS NOTES
Spoke with patient. HIPAA verified. Advised of results per provider note. Verbalized understanding & appreciation.

## 2019-03-11 ENCOUNTER — TELEPHONE (OUTPATIENT)
Dept: ONCOLOGY | Age: 54
End: 2019-03-11

## 2019-03-19 ENCOUNTER — DOCUMENTATION ONLY (OUTPATIENT)
Dept: ONCOLOGY | Age: 54
End: 2019-03-19

## 2019-03-19 ENCOUNTER — TELEPHONE (OUTPATIENT)
Dept: ONCOLOGY | Age: 54
End: 2019-03-19

## 2019-03-19 NOTE — TELEPHONE ENCOUNTER
[facility] pharmacy called and stated that they need a prior authorization for Ibrance 75 mg     Call with questions [phone]

## 2019-03-20 RX ORDER — SODIUM CHLORIDE 9 MG/ML
10 INJECTION INTRAMUSCULAR; INTRAVENOUS; SUBCUTANEOUS AS NEEDED
Status: CANCELLED | OUTPATIENT
Start: 2019-03-26

## 2019-03-20 RX ORDER — HEPARIN 100 UNIT/ML
300-500 SYRINGE INTRAVENOUS AS NEEDED
Status: CANCELLED | OUTPATIENT
Start: 2019-03-26

## 2019-03-20 RX ORDER — SODIUM CHLORIDE 0.9 % (FLUSH) 0.9 %
10 SYRINGE (ML) INJECTION AS NEEDED
Status: CANCELLED | OUTPATIENT
Start: 2019-03-26

## 2019-03-20 RX ORDER — HEPARIN 100 UNIT/ML
300-500 SYRINGE INTRAVENOUS AS NEEDED
Status: CANCELLED | OUTPATIENT
Start: 2019-04-24

## 2019-03-20 RX ORDER — SODIUM CHLORIDE 0.9 % (FLUSH) 0.9 %
10 SYRINGE (ML) INJECTION AS NEEDED
Status: CANCELLED | OUTPATIENT
Start: 2019-04-24

## 2019-03-20 RX ORDER — SODIUM CHLORIDE 9 MG/ML
10 INJECTION INTRAMUSCULAR; INTRAVENOUS; SUBCUTANEOUS AS NEEDED
Status: CANCELLED | OUTPATIENT
Start: 2019-04-24

## 2019-03-20 NOTE — TELEPHONE ENCOUNTER
Called [facility] and they could not complete [place] for Ibrance. Case number given #[id number]. [id number] [facility] and completed [place]. Information gone for clinical review. Clinical Review # [id number].

## 2019-03-20 NOTE — TELEPHONE ENCOUNTER
Spoke with [facility]. Requested case #. No case # given in call. Transferred to [facility]ITAL reviewer. [id number] [facility] [phone] Case # [id number].       ( 13:31 min)

## 2019-03-22 ENCOUNTER — DOCUMENTATION ONLY (OUTPATIENT)
Dept: ONCOLOGY | Age: 54
End: 2019-03-22

## 2019-03-27 ENCOUNTER — OFFICE VISIT (OUTPATIENT)
Dept: ONCOLOGY | Age: 54
End: 2019-03-27

## 2019-03-27 ENCOUNTER — HOSPITAL ENCOUNTER (OUTPATIENT)
Dept: INFUSION THERAPY | Age: 54
Discharge: HOME | End: 2019-03-27
Payer: COMMERCIAL

## 2019-03-27 ENCOUNTER — TELEPHONE (OUTPATIENT)
Dept: ONCOLOGY | Age: 54
End: 2019-03-27

## 2019-03-27 VITALS
BODY MASS INDEX: 42.41 KG/M2 | RESPIRATION RATE: 16 BRPM | DIASTOLIC BLOOD PRESSURE: 82 MMHG | HEART RATE: 63 BPM | HEIGHT: 64 IN | SYSTOLIC BLOOD PRESSURE: 128 MMHG | TEMPERATURE: 97.9 F | WEIGHT: 248.4 LBS | OXYGEN SATURATION: 95 %

## 2019-03-27 DIAGNOSIS — C50.919 METASTATIC BREAST CANCER (HCC): Primary | ICD-10-CM

## 2019-03-27 DIAGNOSIS — D70.2 DRUG-INDUCED LEUKOPENIA (HCC): ICD-10-CM

## 2019-03-27 DIAGNOSIS — R73.03 PRE-DIABETES: ICD-10-CM

## 2019-03-27 DIAGNOSIS — R91.1 LUNG NODULE: ICD-10-CM

## 2019-03-27 DIAGNOSIS — C79.51 CANCER, METASTATIC TO BONE (HCC): ICD-10-CM

## 2019-03-27 DIAGNOSIS — C78.7 LIVER METASTASES (HCC): ICD-10-CM

## 2019-03-27 DIAGNOSIS — Z51.81 THERAPEUTIC DRUG MONITORING: ICD-10-CM

## 2019-03-27 LAB
ALBUMIN SERPL-MCNC: 3.2 G/DL (ref 3.5–5)
ALBUMIN/GLOB SERPL: 1.1 {RATIO} (ref 1.1–2.2)
ALP SERPL-CCNC: 34 U/L (ref 45–117)
ALT SERPL-CCNC: 30 U/L (ref 12–78)
ANION GAP SERPL CALC-SCNC: 3 MMOL/L (ref 5–15)
AST SERPL-CCNC: 27 U/L (ref 15–37)
BASOPHILS # BLD: 0 K/UL (ref 0–0.1)
BASOPHILS NFR BLD: 0 % (ref 0–1)
BILIRUB SERPL-MCNC: 0.6 MG/DL (ref 0.2–1)
BUN SERPL-MCNC: 21 MG/DL (ref 6–20)
BUN/CREAT SERPL: 23 (ref 12–20)
CALCIUM SERPL-MCNC: 8 MG/DL (ref 8.5–10.1)
CHLORIDE SERPL-SCNC: 115 MMOL/L (ref 97–108)
CO2 SERPL-SCNC: 25 MMOL/L (ref 21–32)
CREAT SERPL-MCNC: 0.92 MG/DL (ref 0.55–1.02)
DIFFERENTIAL METHOD BLD: ABNORMAL
EOSINOPHIL # BLD: 0.1 K/UL (ref 0–0.4)
EOSINOPHIL NFR BLD: 2 % (ref 0–7)
ERYTHROCYTE [DISTWIDTH] IN BLOOD BY AUTOMATED COUNT: 14.6 % (ref 11.5–14.5)
GLOBULIN SER CALC-MCNC: 2.9 G/DL (ref 2–4)
GLUCOSE SERPL-MCNC: 129 MG/DL (ref 65–100)
HCT VFR BLD AUTO: 35.5 % (ref 35–47)
HGB BLD-MCNC: 11.5 G/DL (ref 11.5–16)
IMM GRANULOCYTES # BLD AUTO: 0 K/UL
IMM GRANULOCYTES NFR BLD AUTO: 0 %
LYMPHOCYTES # BLD: 1.1 K/UL (ref 0.8–3.5)
LYMPHOCYTES NFR BLD: 44 % (ref 12–49)
MAGNESIUM SERPL-MCNC: 1.9 MG/DL (ref 1.6–2.4)
MCH RBC QN AUTO: 31.1 PG (ref 26–34)
MCHC RBC AUTO-ENTMCNC: 32.4 G/DL (ref 30–36.5)
MCV RBC AUTO: 95.9 FL (ref 80–99)
METAMYELOCYTES NFR BLD MANUAL: 1 %
MONOCYTES # BLD: 0.1 K/UL (ref 0–1)
MONOCYTES NFR BLD: 3 % (ref 5–13)
NEUTS SEG # BLD: 1.3 K/UL (ref 1.8–8)
NEUTS SEG NFR BLD: 50 % (ref 32–75)
NRBC # BLD: 0 K/UL (ref 0–0.01)
NRBC BLD-RTO: 0 PER 100 WBC
PHOSPHATE SERPL-MCNC: 2.1 MG/DL (ref 2.6–4.7)
PLATELET # BLD AUTO: 127 K/UL (ref 150–400)
PMV BLD AUTO: 11.5 FL (ref 8.9–12.9)
POTASSIUM SERPL-SCNC: 3.5 MMOL/L (ref 3.5–5.1)
PROT SERPL-MCNC: 6.1 G/DL (ref 6.4–8.2)
RBC # BLD AUTO: 3.7 M/UL (ref 3.8–5.2)
RBC MORPH BLD: ABNORMAL
SODIUM SERPL-SCNC: 143 MMOL/L (ref 136–145)
WBC # BLD AUTO: 2.6 K/UL (ref 3.6–11)

## 2019-03-27 PROCEDURE — 80053 COMPREHEN METABOLIC PANEL: CPT

## 2019-03-27 PROCEDURE — 96372 THER/PROPH/DIAG INJ SC/IM: CPT

## 2019-03-27 PROCEDURE — 74011250636 HC RX REV CODE- 250/636: Performed by: NURSE PRACTITIONER

## 2019-03-27 PROCEDURE — 86300 IMMUNOASSAY TUMOR CA 15-3: CPT

## 2019-03-27 PROCEDURE — 74011250636 HC RX REV CODE- 250/636: Performed by: INTERNAL MEDICINE

## 2019-03-27 PROCEDURE — 83735 ASSAY OF MAGNESIUM: CPT

## 2019-03-27 PROCEDURE — 85025 COMPLETE CBC W/AUTO DIFF WBC: CPT

## 2019-03-27 PROCEDURE — 84100 ASSAY OF PHOSPHORUS: CPT

## 2019-03-27 PROCEDURE — 74011000250 HC RX REV CODE- 250: Performed by: INTERNAL MEDICINE

## 2019-03-27 PROCEDURE — 36415 COLL VENOUS BLD VENIPUNCTURE: CPT

## 2019-03-27 RX ORDER — SODIUM CHLORIDE 9 MG/ML
10 INJECTION INTRAMUSCULAR; INTRAVENOUS; SUBCUTANEOUS AS NEEDED
Status: DISCONTINUED | OUTPATIENT
Start: 2019-03-27 | End: 2019-03-28 | Stop reason: HOSPADM

## 2019-03-27 RX ORDER — SODIUM CHLORIDE 0.9 % (FLUSH) 0.9 %
5-10 SYRINGE (ML) INJECTION AS NEEDED
Status: DISCONTINUED | OUTPATIENT
Start: 2019-03-27 | End: 2019-03-28 | Stop reason: HOSPADM

## 2019-03-27 RX ORDER — SODIUM CHLORIDE 0.9 % (FLUSH) 0.9 %
10 SYRINGE (ML) INJECTION AS NEEDED
Status: DISCONTINUED | OUTPATIENT
Start: 2019-03-27 | End: 2019-03-28 | Stop reason: HOSPADM

## 2019-03-27 RX ORDER — HEPARIN 100 UNIT/ML
500 SYRINGE INTRAVENOUS AS NEEDED
Status: DISCONTINUED | OUTPATIENT
Start: 2019-03-27 | End: 2019-03-28 | Stop reason: HOSPADM

## 2019-03-27 RX ORDER — HEPARIN 100 UNIT/ML
300-500 SYRINGE INTRAVENOUS AS NEEDED
Status: DISCONTINUED | OUTPATIENT
Start: 2019-03-27 | End: 2019-03-28 | Stop reason: HOSPADM

## 2019-03-27 RX ORDER — CETIRIZINE HCL 10 MG
10 TABLET ORAL
COMMUNITY

## 2019-03-27 RX ADMIN — SODIUM CHLORIDE 10 ML: 9 INJECTION, SOLUTION INTRAMUSCULAR; INTRAVENOUS; SUBCUTANEOUS at 11:35

## 2019-03-27 RX ADMIN — DENOSUMAB 120 MG: 120 INJECTION SUBCUTANEOUS at 12:53

## 2019-03-27 RX ADMIN — Medication 500 UNITS: at 11:36

## 2019-03-27 RX ADMIN — SODIUM CHLORIDE 10 ML: 9 INJECTION, SOLUTION INTRAMUSCULAR; INTRAVENOUS; SUBCUTANEOUS at 11:36

## 2019-03-27 NOTE — PROGRESS NOTES
PEDI [facility] VISIT NOTE      1130 Patient arrives for Xgeva/Port Flush without acute problems. Please see connect care for complete assessment and education provided.      LCW port accessed without difficulty with 0.75 hunt needle; + blood return noted; labs drawn and sent for processing.      Vitals Signs:  Patient Vitals for the past 12 hrs:   Temp Pulse Resp BP   03/27/19 1130 (P) 98.2 °F (36.8 °C) (P) 61 (P) 16 (P) 120/78       Lab Results:  Recent Results (from the past 12 hour(s))   PHOSPHORUS    Collection Time: 03/27/19 11:26 AM   Result Value Ref Range    Phosphorus 2.1 (L) 2.6 - 4.7 MG/DL   MAGNESIUM    Collection Time: 03/27/19 11:26 AM   Result Value Ref Range    Magnesium 1.9 1.6 - 2.4 mg/dL   METABOLIC PANEL, COMPREHENSIVE    Collection Time: 03/27/19 11:26 AM   Result Value Ref Range    Sodium 143 136 - 145 mmol/L    Potassium 3.5 3.5 - 5.1 mmol/L    Chloride 115 (H) 97 - 108 mmol/L    CO2 25 21 - 32 mmol/L    Anion gap 3 (L) 5 - 15 mmol/L    Glucose 129 (H) 65 - 100 mg/dL    BUN 21 (H) 6 - 20 MG/DL    Creatinine 0.92 0.55 - 1.02 MG/DL    BUN/Creatinine ratio 23 (H) 12 - 20      GFR est AA >60 >60 ml/min/1.73m2    GFR est non-AA >60 >60 ml/min/1.73m2    Calcium 8.0 (L) 8.5 - 10.1 MG/DL    Bilirubin, total 0.6 0.2 - 1.0 MG/DL    ALT (SGPT) 30 12 - 78 U/L    AST (SGOT) 27 15 - 37 U/L    Alk.  phosphatase 34 (L) 45 - 117 U/L    Protein, total 6.1 (L) 6.4 - 8.2 g/dL    Albumin 3.2 (L) 3.5 - 5.0 g/dL    Globulin 2.9 2.0 - 4.0 g/dL    A-G Ratio 1.1 1.1 - 2.2     CBC WITH AUTOMATED DIFF    Collection Time: 03/27/19 11:26 AM   Result Value Ref Range    WBC 2.6 (L) 3.6 - 11.0 K/uL    RBC 3.70 (L) 3.80 - 5.20 M/uL    HGB 11.5 11.5 - 16.0 g/dL    HCT 35.5 35.0 - 47.0 %    MCV 95.9 80.0 - 99.0 FL    MCH 31.1 26.0 - 34.0 PG    MCHC 32.4 30.0 - 36.5 g/dL    RDW 14.6 (H) 11.5 - 14.5 %    PLATELET 521 (L) 397 - 400 K/uL    MPV 11.5 8.9 - 12.9 FL    NRBC 0.0 0  WBC    ABSOLUTE NRBC 0.00 0.00 - 0.01 K/uL    NEUTROPHILS PENDING %    LYMPHOCYTES PENDING %    MONOCYTES PENDING %    EOSINOPHILS PENDING %    BASOPHILS PENDING %    IMMATURE GRANULOCYTES PENDING %    ABS. NEUTROPHILS PENDING K/UL    ABS. LYMPHOCYTES PENDING K/UL    ABS. MONOCYTES PENDING K/UL    ABS. EOSINOPHILS PENDING K/UL    ABS. BASOPHILS PENDING K/UL    ABS. IMM. GRANS. PENDING K/UL    DF PENDING        Patient's labs within parameters for treatment. Corrected calcium 8.64     Medications:  Verified by Samir Vallejo RN via [employer]  1. Xgeva SQ-Left arm     Patient's Port was flushed, heparinized; and de-accessed per protocol removed and bandage placed over site. 1300 Vital signs stable throughout and prior to discharge, Patient tolerated treatment well and discharged without incident.  Patient/parent is aware of next [facility] appointment on 4/24/2019 @ 11:00am.

## 2019-03-27 NOTE — TELEPHONE ENCOUNTER
Patient called and would like to know if she should drop off her disc and if they have been reviewed. She would like a callback from from [facility].     [phone]

## 2019-03-27 NOTE — PROGRESS NOTES
[facility] [place] [address] [address], [address] W: [phone]  F: [phone] for Visit:  
Luan Epps is a 52 y.o. female who is seen for follow up of metastatic breast cancer on Ibrance/Femara/Xgeva. Treatment History: Anastrozole/Ibrance/Xgeva [place] 3/18 in [place] 
STAGE: dx 8/17 4 with L2 bone mets biopsied in [place] Prior stage 1 RIGHT breast cancer 2016 lump/XRT Tamoxifen History of Present Illness:  
 
Patient seen today for office for f/u metastatic breast cancer on Ibrance/Femara/Xgeva. Patient is feeling well overall. Tolerating Ibrance well. She finished cycle of Ibrance on Monday 3/25/19 and will restart next Monday 4/1/19. She had CTs 11/18 here with nonspecific liver mets/ stable. Patient's labs have been good/stable on Ibrance. She recently retired and is happy about this. She is enjoying detention thus far. She reports that her appetite and energy levels are good. She joined [employer] and is exercising more now. She and her  also take walks together. Patient denies fever, chills, cough, shortness of breath, chest pain, nausea, vomiting, diarrhea, constipation, and abdominal pain. She is here alone today. Oncologic Hx: 
Metastatic breast cancer ER+ HER2 negative seen and treated in [place]. Patient initially dx in 2011 with ? Met breast cancer to liver. Had chemo IV then Xeloda. Had Lupron. Patient had mammo 2015 which was abnormal and then had breast biopsy and breast cancer. Then patient had breast surgery/ XRT. During this time patient states liver mets \"went away\". F/u visit 2017 showed bone met lumbar spine. Patient was on Arimidex. Patient is currently on Femara/Ibrance/Xgeva since 3/18. Last PET 3/18 negative except bone. Liver contour abnormalities thought to be cirrhosis. Patient has a port. Past Medical History:  
Diagnosis Date  Arthritis  Cancer ([address]  Diabetes ([address] Past Surgical History:  
Procedure Laterality Date  BREAST SURGERY PROCEDURE UNLISTED    
 partial mascetomies  HX LAP CHOLECYSTECTOMY Social History Tobacco Use  Smoking status: Never Smoker  Smokeless tobacco: Never Used Substance Use Topics  Alcohol use: Yes Comment: socially Family History Problem Relation Age of Onset  Cancer Mother [facility] Arthritis-osteo Mother  Hypertension Mother  Heart Disease Father  Hypertension Sister  Hypertension Brother Current Outpatient Medications Medication Sig  cetirizine (ZYRTEC) 10 mg tablet Take 10 mg by mouth.  magnesium 250 mg tab Take  by mouth daily.  aspirin delayed-release 81 mg tablet Take  by mouth daily.  letrozole (FEMARA) 2.5 mg tablet Take 1 Tab by mouth daily.  FEMARA 2.5 mg tablet TK 1 T PO QD  
 palbociclib 75 mg cap Take 1 capsule (75mg) daily for 21 days on followed by 7 days off  multivitamin (ONE A DAY) tablet multivitamin  B infantis/B ani/B ivory/B bifid (PROBIOTIC 4X PO) Probiotic  denosumab (Hoy Ludivina SC) Hector Rad  ascorbic acid, vitamin C, (VITAMIN C) 250 mg tablet Take 500 mg by mouth two (2) times a day.  calcium-cholecalciferol, D3, (CALTRATE 600+D) tablet Take 1 Tab by mouth daily.  metFORMIN (GLUCOPHAGE) 500 mg tablet Take  by mouth daily. No current facility-administered medications for this visit. Facility-Administered Medications Ordered in Other Visits Medication Dose Route Frequency  sodium chloride 0.9% injection 10 mL  10 mL IntraVENous PRN  
 heparin (porcine) pf 500 Units  500 Units IntraVENous PRN  
 sodium chloride (NS) flush 5-10 mL  5-10 mL IntraVENous PRN  
 saline peripheral flush soln 10 mL  10 mL InterCATHeter PRN  
 sodium chloride 0.9% injection 10 mL  10 mL IntraVENous PRN  
 heparin (porcine) pf 300-500 Units  300-500 Units InterCATHeter PRN Allergies Allergen Reactions  Adhesive Rash Review of Systems: A complete review of systems was obtained, negative except as described above. Physical Exam:  
 
Visit Vitals /82 (BP 1 Location: Left arm, BP Patient Position: Sitting) Pulse 63 Temp 97.9 °F (36.6 °C) (Oral) Resp 16 Ht 5' 4\" (1.626 m) Wt 248 lb 6.4 oz (112.7 kg) SpO2 95% BMI 42.64 kg/m² ECOG PS: 0 General: No distress Eyes: PERRLA, anicteric sclerae HENT: Atraumatic, OP clear Neck: Supple Lymphatic: No cervical, supraclavicular Respiratory: CTAB, normal respiratory effort CV: Normal rate, regular rhythm, no murmurs, no peripheral edema GI: Soft, nondistended MS: Normal gait and station. Skin: No rashes, ecchymoses, or petechiae. Normal temperature, turgor, and texture. Psych: Alert, oriented, appropriate affect, normal judgment/insight Results:  
 
Lab Results Component Value Date/Time WBC 2.6 (L) 03/27/2019 11:26 AM  
 HGB 11.5 03/27/2019 11:26 AM  
 HCT 35.5 03/27/2019 11:26 AM  
 PLATELET 743 (L) 55/32/5580 11:26 AM  
 MCV 95.9 03/27/2019 11:26 AM  
 ABS. NEUTROPHILS 1.3 (L) 03/27/2019 11:26 AM  
 
Lab Results Component Value Date/Time Sodium 143 03/27/2019 11:26 AM  
 Potassium 3.5 03/27/2019 11:26 AM  
 Chloride 115 (H) 03/27/2019 11:26 AM  
 CO2 25 03/27/2019 11:26 AM  
 Glucose 129 (H) 03/27/2019 11:26 AM  
 BUN 21 (H) 03/27/2019 11:26 AM  
 Creatinine 0.92 03/27/2019 11:26 AM  
 GFR est AA >60 03/27/2019 11:26 AM  
 GFR est non-AA >60 03/27/2019 11:26 AM  
 Calcium 8.0 (L) 03/27/2019 11:26 AM  
 
Lab Results Component Value Date/Time Bilirubin, total 0.6 03/27/2019 11:26 AM  
 ALT (SGPT) 30 03/27/2019 11:26 AM  
 AST (SGOT) 27 03/27/2019 11:26 AM  
 Alk. phosphatase 34 (L) 03/27/2019 11:26 AM  
 Protein, total 6.1 (L) 03/27/2019 11:26 AM  
 Albumin 3.2 (L) 03/27/2019 11:26 AM  
 Globulin 2.9 03/27/2019 11:26 AM  
 
CT Results (most recent): 
Results from Hospital Encounter encounter on 11/07/18 CT ABD PELV W CONT Addendum Addendum: Comparison was made to a PET/CT dated 3/9/2018 and abdominal CT  dated 2/9/2018 from Pamela Simons MD 11/20/2018  5:00 PM        
 Narrative EXAM:  CT CHEST W CONT, CT ABD PELV W CONT INDICATION: restaging metastatic breast cancer COMPARISON: 2018 CONTRAST:  100 mL of Isovue-370. TECHNIQUE:  
Following the uneventful intravenous administration of contrast, thin axial 
images were obtained through the chest, abdomen and pelvis. Coronal and sagittal 
reconstructions were generated. Oral contrast was not administered. CT dose 
reduction was achieved through use of a standardized protocol tailored for this 
examination and automatic exposure control for dose modulation. FINDINGS:  
Postoperative changes are noted in the right breast. 
THYROID: No nodule. MEDIASTINUM: 1.4 cm pretracheal lymph node is stable. ABDON: No mass or lymphadenopathy. THORACIC AORTA: No dissection or aneurysm. MAIN PULMONARY ARTERY: Normal in caliber. TRACHEA/BRONCHI: Patent. ESOPHAGUS: No wall thickening or dilatation. HEART: Normal in size. PLEURA: No effusion or pneumothorax. LUNGS: 2 mm nodule at the right lung base is stable. No left pulmonary nodules 
identified. LIVER: Lobulation of the liver most consistent with cirrhosis is unchanged. There is questionable 8mm low density in the liver just beneath the dome of the 
diaphragm. GALLBLADDER: Surgically absent SPLEEN: No mass. PANCREAS: No mass or ductal dilatation. ADRENALS: Unremarkable. KIDNEYS: No mass, calculus, or hydronephrosis. STOMACH: Unremarkable. SMALL BOWEL: No dilatation or wall thickening. COLON: No dilatation or wall thickening. APPENDIX: Unremarkable. PERITONEUM: No ascites or pneumoperitoneum. RETROPERITONEUM: No lymphadenopathy or aortic aneurysm. REPRODUCTIVE ORGANS: The uterus is lobulated with multiple masses again noted consistent with multiple leiomyomata. There is a 2.4 x 3.9 cm low-density 
centrally could represent prominent endometrium. CT dated 2/9/2018 did not have 
images of this area URINARY BLADDER: No mass or calculus. BONES: Sclerotic lesion L1 is unchanged ADDITIONAL COMMENTS: N/A Impression IMPRESSION: 
CT the chest demonstrates no definite evidence of metastatic disease. Postoperative changes are noted right breast are stable. . 2 mm nodule right lung 
base is unchanged. Sclerotic lesion at L1 is unchanged. Liver is irregular in contour suspicious for cirrhosis which is unchanged. There 
is an 8 mm vague low-density in the liver just at the dome of diaphragm segment 8 which is equivocal and follow-up studies would be helpful to assess for 
interval change. Lobular contour of the uterus with multiple mass lesions consistent with 
leiomyomata are again noted. There is low-density centrally measuring 2.4 x 4 cm 
within the uterus which could represent prominent endometrium versus low-density 
leiomyoma. No images of the pelvis from the CT dated 2/9/2018 are included. Records reviewed and summarized above. Pathology report(s) reviewed above. Radiology report(s) reviewed above. Assessment/PLAN:  
 
1)  Metastatic Breast Cancer ER+ HER2 negative to bones and liver from [place]. Prior stage 1 breast cancer 2011 and 2016. Patient reports prior hx of liver mets with hx of chemo IV and Xeloda. No complete records from [place] onocology. Patient is a good historian. No evidence of liver mets now per patient. Last PET 3/18 negative except for bone mets. Patient is doing well on current regimen of Femara/Ibrance/Xgeva. Tolerating regimen well. CTs 11/18 show nonspecific liver disease. Present on prior CTs. CTs 3/19/19 at [facility] due to cost? CTs stable. No liver lesions present. Reviewed with patient today. Patient clinically has no pain and is doing well overall. Will continue monthly labs with PF. Labs pending today. 2) Hx of reported liver mets with changes on CT chronically/lung nodule CT here with possible liver lesion 8mm. CTs 3/19/19 at [address] due to cost? CTs 3/19 with no liver lesion. CTs stable and reviewed with patient today. 3)  Possible preDM Management per PCP. 4) Psychosocial 
Mood good. Coping well. Retired in 2/19 and is happy about this. Enjoying long-term thus far. [place] and is exercising more. Good support from family. Here alone today. Call if questions. Labs monthly and f/u here in 2 months. This patient was seen in conjunction with Rozanna Osler, NP. I appreciate the opportunity to participate in Ms. [address] [facility].  
 
Signed By: Debarah Leyden, DO

## 2019-03-27 NOTE — LETTER
3/27/19 Patient: Lala Ye YOB: 1965 Date of Visit: 3/27/2019 Nj Gtz MD 
[address] [place] [address] [address] [place] [id number] VIA Facsimile: [phone] Dear Nj Gtz MD, Thank you for referring Ms. Irvine Koyanagi to [address] [place] [address] for evaluation. My notes for this consultation are attached. If you have questions, please do not hesitate to call me. I look forward to following your patient along with you. Sincerely, Jaspreet Burciaga NP

## 2019-03-27 NOTE — PROGRESS NOTES
Identified pt with two pt identifiers(name and ). Reviewed record in preparation for visit and have obtained necessary documentation. All patient medications has been reviewed. Chief Complaint Patient presents with  
 Other  
  infusion day Vitals:  
 19 1150 BP: 128/82 Pulse: 63 Resp: 16 Temp: 97.9 °F (36.6 °C) TempSrc: Oral  
SpO2: 95% Weight: 248 lb 6.4 oz (112.7 kg) Height: 5' 4\" (1.626 m) PainSc:   0 - No pain Coordination of Care Questionnaire: 
:  
1) Have you been to an emergency room, urgent care, or hospitalized since your last visit? Yes, [facility] infection 2. Have seen or consulted any other health care provider since your last visit? NO 
 
3) Do you have an Advanced Directive/ Living Will in place? YES If yes, do we have a copy on file NO If no, would you like information NO Patient is accompanied by self I have received verbal consent from Trina Florian to discuss any/all medical information while they are present in the room.

## 2019-03-28 ENCOUNTER — DOCUMENTATION ONLY (OUTPATIENT)
Dept: ONCOLOGY | Age: 54
End: 2019-03-28

## 2019-03-28 DIAGNOSIS — E83.39 HYPOPHOSPHATEMIA: Primary | ICD-10-CM

## 2019-03-28 LAB — CANCER AG27-29 SERPL-ACNC: 42.2 U/ML (ref 0–38.6)

## 2019-03-28 RX ORDER — SODIUM,POTASSIUM PHOSPHATES 280-250MG
2 POWDER IN PACKET (EA) ORAL 4 TIMES DAILY
Qty: 8 PACKET | Refills: 0 | Status: SHIPPED | OUTPATIENT
Start: 2019-03-28 | End: 2019-03-29

## 2019-04-17 ENCOUNTER — TELEPHONE (OUTPATIENT)
Dept: ONCOLOGY | Age: 54
End: 2019-04-17

## 2019-04-18 NOTE — TELEPHONE ENCOUNTER
Called the patient and verified ID x 2. The patient stated that she requires a refill on her Femara however per her [address] will not have any suppply until May 12, 2019 and that per Fatimah they called throughout the [facility] and they were not able to fill this prescription at any of these locations. The patient also stated that she is going to [facility] next week and is unsure if any pharmacies may be able to fill the prescription there and that she only has three pills left and that she had a \"bad experience\" with generic pills before. Informed the patient that Dr. Isatu Espinoza and Evie Correia NP will be made aware of her questions and concerns and this office will call her back with an update.

## 2019-04-19 NOTE — TELEPHONE ENCOUNTER
Called the patient and was unable to leave a voicemail message as the voicemail inbox is full. Called to inquire if the patient has a pharmacy in [place] that this office can send a prescription and that per Rodriguez Acevedo NP the medication stays in her system and that being off the medication for one to three weeks will not impact her treatment as this medication is frequently held for side effects for several weeks.

## 2019-04-22 NOTE — TELEPHONE ENCOUNTER
Called the patient and left a message to call Dr. Mony Schwartz office back as soon as she is able to for an update on her medication question.

## 2019-04-24 ENCOUNTER — HOSPITAL ENCOUNTER (OUTPATIENT)
Dept: INFUSION THERAPY | Age: 54
Discharge: HOME | End: 2019-04-24
Payer: COMMERCIAL

## 2019-04-24 VITALS
SYSTOLIC BLOOD PRESSURE: 127 MMHG | HEART RATE: 69 BPM | TEMPERATURE: 98.3 F | DIASTOLIC BLOOD PRESSURE: 82 MMHG | OXYGEN SATURATION: 97 % | RESPIRATION RATE: 16 BRPM

## 2019-04-24 DIAGNOSIS — C79.51 CANCER, METASTATIC TO BONE (HCC): ICD-10-CM

## 2019-04-24 DIAGNOSIS — C50.919 METASTATIC BREAST CANCER (HCC): Primary | ICD-10-CM

## 2019-04-24 LAB
ALBUMIN SERPL-MCNC: 3.7 G/DL (ref 3.5–5)
ALBUMIN/GLOB SERPL: 1.1 {RATIO} (ref 1.1–2.2)
ALP SERPL-CCNC: 41 U/L (ref 45–117)
ALT SERPL-CCNC: 21 U/L (ref 12–78)
ANION GAP SERPL CALC-SCNC: 5 MMOL/L (ref 5–15)
AST SERPL-CCNC: 27 U/L (ref 15–37)
BASOPHILS # BLD: 0.1 K/UL (ref 0–0.1)
BASOPHILS NFR BLD: 2 % (ref 0–1)
BILIRUB SERPL-MCNC: 0.7 MG/DL (ref 0.2–1)
BUN SERPL-MCNC: 19 MG/DL (ref 6–20)
BUN/CREAT SERPL: 17 (ref 12–20)
CALCIUM SERPL-MCNC: 9.2 MG/DL (ref 8.5–10.1)
CHLORIDE SERPL-SCNC: 110 MMOL/L (ref 97–108)
CO2 SERPL-SCNC: 26 MMOL/L (ref 21–32)
CREAT SERPL-MCNC: 1.11 MG/DL (ref 0.55–1.02)
DIFFERENTIAL METHOD BLD: ABNORMAL
EOSINOPHIL # BLD: 0.1 K/UL (ref 0–0.4)
EOSINOPHIL NFR BLD: 3 % (ref 0–7)
ERYTHROCYTE [DISTWIDTH] IN BLOOD BY AUTOMATED COUNT: 14.3 % (ref 11.5–14.5)
GLOBULIN SER CALC-MCNC: 3.5 G/DL (ref 2–4)
GLUCOSE SERPL-MCNC: 108 MG/DL (ref 65–100)
HCT VFR BLD AUTO: 38.2 % (ref 35–47)
HGB BLD-MCNC: 12.3 G/DL (ref 11.5–16)
IMM GRANULOCYTES # BLD AUTO: 0 K/UL
IMM GRANULOCYTES NFR BLD AUTO: 0 %
LYMPHOCYTES # BLD: 1.2 K/UL (ref 0.8–3.5)
LYMPHOCYTES NFR BLD: 49 % (ref 12–49)
MAGNESIUM SERPL-MCNC: 2.3 MG/DL (ref 1.6–2.4)
MCH RBC QN AUTO: 30.4 PG (ref 26–34)
MCHC RBC AUTO-ENTMCNC: 32.2 G/DL (ref 30–36.5)
MCV RBC AUTO: 94.6 FL (ref 80–99)
MONOCYTES # BLD: 0.2 K/UL (ref 0–1)
MONOCYTES NFR BLD: 7 % (ref 5–13)
NEUTS SEG # BLD: 1 K/UL (ref 1.8–8)
NEUTS SEG NFR BLD: 39 % (ref 32–75)
NRBC # BLD: 0 K/UL (ref 0–0.01)
NRBC BLD-RTO: 0 PER 100 WBC
PHOSPHATE SERPL-MCNC: 2.9 MG/DL (ref 2.6–4.7)
PLATELET # BLD AUTO: 128 K/UL (ref 150–400)
PLATELET COMMENTS,PCOM: ABNORMAL
PMV BLD AUTO: 11.4 FL (ref 8.9–12.9)
POTASSIUM SERPL-SCNC: 4 MMOL/L (ref 3.5–5.1)
PROT SERPL-MCNC: 7.2 G/DL (ref 6.4–8.2)
RBC # BLD AUTO: 4.04 M/UL (ref 3.8–5.2)
RBC MORPH BLD: ABNORMAL
SODIUM SERPL-SCNC: 141 MMOL/L (ref 136–145)
WBC # BLD AUTO: 2.6 K/UL (ref 3.6–11)

## 2019-04-24 PROCEDURE — 77030012965 HC NDL HUBR BBMI -A

## 2019-04-24 PROCEDURE — 36415 COLL VENOUS BLD VENIPUNCTURE: CPT

## 2019-04-24 PROCEDURE — 74011250636 HC RX REV CODE- 250/636: Performed by: NURSE PRACTITIONER

## 2019-04-24 PROCEDURE — 80053 COMPREHEN METABOLIC PANEL: CPT

## 2019-04-24 PROCEDURE — 83735 ASSAY OF MAGNESIUM: CPT

## 2019-04-24 PROCEDURE — 74011000250 HC RX REV CODE- 250: Performed by: NURSE PRACTITIONER

## 2019-04-24 PROCEDURE — 96372 THER/PROPH/DIAG INJ SC/IM: CPT

## 2019-04-24 PROCEDURE — 86300 IMMUNOASSAY TUMOR CA 15-3: CPT

## 2019-04-24 PROCEDURE — 36591 DRAW BLOOD OFF VENOUS DEVICE: CPT

## 2019-04-24 PROCEDURE — 85025 COMPLETE CBC W/AUTO DIFF WBC: CPT

## 2019-04-24 PROCEDURE — 84100 ASSAY OF PHOSPHORUS: CPT

## 2019-04-24 RX ORDER — HEPARIN 100 UNIT/ML
300-500 SYRINGE INTRAVENOUS AS NEEDED
Status: DISPENSED | OUTPATIENT
Start: 2019-04-24 | End: 2019-04-24

## 2019-04-24 RX ORDER — SODIUM CHLORIDE 0.9 % (FLUSH) 0.9 %
10 SYRINGE (ML) INJECTION AS NEEDED
Status: DISPENSED | OUTPATIENT
Start: 2019-04-24 | End: 2019-04-24

## 2019-04-24 RX ORDER — SODIUM CHLORIDE 9 MG/ML
10 INJECTION INTRAMUSCULAR; INTRAVENOUS; SUBCUTANEOUS AS NEEDED
Status: DISPENSED | OUTPATIENT
Start: 2019-04-24 | End: 2019-04-24

## 2019-04-24 RX ADMIN — SODIUM CHLORIDE 10 ML: 9 INJECTION, SOLUTION INTRAMUSCULAR; INTRAVENOUS; SUBCUTANEOUS at 11:22

## 2019-04-24 RX ADMIN — SODIUM CHLORIDE, PRESERVATIVE FREE 500 UNITS: 5 INJECTION INTRAVENOUS at 11:23

## 2019-04-24 RX ADMIN — DENOSUMAB 120 MG: 120 INJECTION SUBCUTANEOUS at 13:02

## 2019-04-24 RX ADMIN — Medication 10 ML: at 11:23

## 2019-04-24 NOTE — PROGRESS NOTES
[address] [facility] VISIT NOTE     1110 Patient arrives for Monthly Xgeva, labs & port flush without acute problems. Please see connect care for complete assessment and education provided. Vital signs stable throughout and prior to discharge, Pt. Tolerated treatment well and discharged without incident. Patient/parent is aware of next [facility] appointment on 05/22/2019 @ 11:00 AM. Appointment card given to patient/parents. Medications Verified by Kathrin Rowe RN & Cr Randolph RN/Nicole Gilberto Cabot RN via [employer]:  1. Xgeva 120 MG SQ  2. Heparin 500 IV       VITAL SIGNS   Patient Vitals for the past 12 hrs:   Temp Pulse Resp BP SpO2   04/24/19 1127 -- -- -- -- 97 %   04/24/19 1114 98.3 °F (36.8 °C) 69 16 127/82 --          LAB WORK  Labs Pending, please see connect care for results. Recent Results (from the past 12 hour(s))   CBC WITH AUTOMATED DIFF    Collection Time: 04/24/19 11:13 AM   Result Value Ref Range    WBC 2.6 (L) 3.6 - 11.0 K/uL    RBC 4.04 3.80 - 5.20 M/uL    HGB 12.3 11.5 - 16.0 g/dL    HCT 38.2 35.0 - 47.0 %    MCV 94.6 80.0 - 99.0 FL    MCH 30.4 26.0 - 34.0 PG    MCHC 32.2 30.0 - 36.5 g/dL    RDW 14.3 11.5 - 14.5 %    PLATELET 368 (L) 379 - 400 K/uL    MPV 11.4 8.9 - 12.9 FL    NRBC 0.0 0  WBC    ABSOLUTE NRBC 0.00 0.00 - 0.01 K/uL    NEUTROPHILS 39 32 - 75 %    LYMPHOCYTES 49 12 - 49 %    MONOCYTES 7 5 - 13 %    EOSINOPHILS 3 0 - 7 %    BASOPHILS 2 (H) 0 - 1 %    IMMATURE GRANULOCYTES 0 %    ABS. NEUTROPHILS 1.0 (L) 1.8 - 8.0 K/UL    ABS. LYMPHOCYTES 1.2 0.8 - 3.5 K/UL    ABS. MONOCYTES 0.2 0.0 - 1.0 K/UL    ABS. EOSINOPHILS 0.1 0.0 - 0.4 K/UL    ABS. BASOPHILS 0.1 0.0 - 0.1 K/UL    ABS. IMM.  GRANS. 0.0 K/UL    DF AUTOMATED      PLATELET COMMENTS Large Platelets      RBC COMMENTS NORMOCYTIC, NORMOCHROMIC     MAGNESIUM    Collection Time: 04/24/19 11:13 AM   Result Value Ref Range    Magnesium 2.3 1.6 - 2.4 mg/dL   PHOSPHORUS    Collection Time: 04/24/19 11:13 AM   Result Value Ref Range    Phosphorus 2.9 2.6 - 4.7 MG/DL   METABOLIC PANEL, COMPREHENSIVE    Collection Time: 04/24/19 11:13 AM   Result Value Ref Range    Sodium 141 136 - 145 mmol/L    Potassium 4.0 3.5 - 5.1 mmol/L    Chloride 110 (H) 97 - 108 mmol/L    CO2 26 21 - 32 mmol/L    Anion gap 5 5 - 15 mmol/L    Glucose 108 (H) 65 - 100 mg/dL    BUN 19 6 - 20 MG/DL    Creatinine 1.11 (H) 0.55 - 1.02 MG/DL    BUN/Creatinine ratio 17 12 - 20      GFR est AA >60 >60 ml/min/1.73m2    GFR est non-AA 51 (L) >60 ml/min/1.73m2    Calcium 9.2 8.5 - 10.1 MG/DL    Bilirubin, total 0.7 0.2 - 1.0 MG/DL    ALT (SGPT) 21 12 - 78 U/L    AST (SGOT) 27 15 - 37 U/L    Alk.  phosphatase 41 (L) 45 - 117 U/L    Protein, total 7.2 6.4 - 8.2 g/dL    Albumin 3.7 3.5 - 5.0 g/dL    Globulin 3.5 2.0 - 4.0 g/dL    A-G Ratio 1.1 1.1 - 2.2

## 2019-04-24 NOTE — TELEPHONE ENCOUNTER
Received a call from the patient and verified ID x 2. The patient stated that she received her medication and is leaving for [facility] tomorrow 4/25/19. Informed the patient that for future reference per Tita Leiva NP holding the medication for two to three weeks does not affect her treatment because this medication is frequently held for that amount of time due to side effects. The patient verbalized understanding and denied any further questions or concerns.

## 2019-04-25 LAB — CANCER AG27-29 SERPL-ACNC: 51.6 U/ML (ref 0–38.6)

## 2019-05-17 RX ORDER — SODIUM CHLORIDE 9 MG/ML
10 INJECTION INTRAMUSCULAR; INTRAVENOUS; SUBCUTANEOUS AS NEEDED
Status: CANCELLED | OUTPATIENT
Start: 2019-07-24

## 2019-05-17 RX ORDER — SODIUM CHLORIDE 9 MG/ML
10 INJECTION INTRAMUSCULAR; INTRAVENOUS; SUBCUTANEOUS AS NEEDED
Status: CANCELLED | OUTPATIENT
Start: 2019-06-26

## 2019-05-17 RX ORDER — SODIUM CHLORIDE 0.9 % (FLUSH) 0.9 %
10 SYRINGE (ML) INJECTION AS NEEDED
Status: CANCELLED | OUTPATIENT
Start: 2019-05-22

## 2019-05-17 RX ORDER — SODIUM CHLORIDE 9 MG/ML
10 INJECTION INTRAMUSCULAR; INTRAVENOUS; SUBCUTANEOUS AS NEEDED
Status: CANCELLED | OUTPATIENT
Start: 2019-05-22

## 2019-05-17 RX ORDER — HEPARIN 100 UNIT/ML
300-500 SYRINGE INTRAVENOUS AS NEEDED
Status: CANCELLED | OUTPATIENT
Start: 2019-06-26

## 2019-05-17 RX ORDER — HEPARIN 100 UNIT/ML
300-500 SYRINGE INTRAVENOUS AS NEEDED
Status: CANCELLED | OUTPATIENT
Start: 2019-05-22

## 2019-05-17 RX ORDER — HEPARIN 100 UNIT/ML
300-500 SYRINGE INTRAVENOUS AS NEEDED
Status: CANCELLED | OUTPATIENT
Start: 2019-07-24

## 2019-05-17 RX ORDER — SODIUM CHLORIDE 0.9 % (FLUSH) 0.9 %
10 SYRINGE (ML) INJECTION AS NEEDED
Status: CANCELLED | OUTPATIENT
Start: 2019-07-24

## 2019-05-17 RX ORDER — SODIUM CHLORIDE 0.9 % (FLUSH) 0.9 %
10 SYRINGE (ML) INJECTION AS NEEDED
Status: CANCELLED | OUTPATIENT
Start: 2019-06-26

## 2019-05-22 ENCOUNTER — OFFICE VISIT (OUTPATIENT)
Dept: ONCOLOGY | Age: 54
End: 2019-05-22

## 2019-05-22 ENCOUNTER — HOSPITAL ENCOUNTER (OUTPATIENT)
Dept: INFUSION THERAPY | Age: 54
Discharge: HOME | End: 2019-05-22
Payer: COMMERCIAL

## 2019-05-22 VITALS
TEMPERATURE: 98.2 F | HEIGHT: 64 IN | SYSTOLIC BLOOD PRESSURE: 128 MMHG | RESPIRATION RATE: 18 BRPM | HEART RATE: 63 BPM | BODY MASS INDEX: 41.04 KG/M2 | WEIGHT: 240.4 LBS | OXYGEN SATURATION: 95 % | DIASTOLIC BLOOD PRESSURE: 85 MMHG

## 2019-05-22 VITALS
OXYGEN SATURATION: 97 % | DIASTOLIC BLOOD PRESSURE: 73 MMHG | SYSTOLIC BLOOD PRESSURE: 120 MMHG | HEART RATE: 64 BPM | RESPIRATION RATE: 16 BRPM | TEMPERATURE: 98 F

## 2019-05-22 DIAGNOSIS — C79.51 CANCER, METASTATIC TO BONE (HCC): ICD-10-CM

## 2019-05-22 DIAGNOSIS — D70.2 DRUG-INDUCED LEUKOPENIA (HCC): ICD-10-CM

## 2019-05-22 DIAGNOSIS — R73.03 PRE-DIABETES: ICD-10-CM

## 2019-05-22 DIAGNOSIS — C78.7 LIVER METASTASES (HCC): ICD-10-CM

## 2019-05-22 DIAGNOSIS — R91.1 LUNG NODULE: ICD-10-CM

## 2019-05-22 DIAGNOSIS — Z51.81 THERAPEUTIC DRUG MONITORING: ICD-10-CM

## 2019-05-22 DIAGNOSIS — C50.919 METASTATIC BREAST CANCER (HCC): Primary | ICD-10-CM

## 2019-05-22 LAB
ALBUMIN SERPL-MCNC: 3.6 G/DL (ref 3.5–5)
ALBUMIN/GLOB SERPL: 1 {RATIO} (ref 1.1–2.2)
ALP SERPL-CCNC: 43 U/L (ref 45–117)
ALT SERPL-CCNC: 31 U/L (ref 12–78)
ANION GAP SERPL CALC-SCNC: 3 MMOL/L (ref 5–15)
AST SERPL-CCNC: 40 U/L (ref 15–37)
BASOPHILS # BLD: 0.1 K/UL (ref 0–0.1)
BASOPHILS NFR BLD: 3 % (ref 0–1)
BILIRUB SERPL-MCNC: 0.7 MG/DL (ref 0.2–1)
BUN SERPL-MCNC: 27 MG/DL (ref 6–20)
BUN/CREAT SERPL: 24 (ref 12–20)
CALCIUM SERPL-MCNC: 9.4 MG/DL (ref 8.5–10.1)
CHLORIDE SERPL-SCNC: 109 MMOL/L (ref 97–108)
CO2 SERPL-SCNC: 29 MMOL/L (ref 21–32)
CREAT SERPL-MCNC: 1.14 MG/DL (ref 0.55–1.02)
DIFFERENTIAL METHOD BLD: ABNORMAL
EOSINOPHIL # BLD: 0.1 K/UL (ref 0–0.4)
EOSINOPHIL NFR BLD: 2 % (ref 0–7)
ERYTHROCYTE [DISTWIDTH] IN BLOOD BY AUTOMATED COUNT: 14.7 % (ref 11.5–14.5)
GLOBULIN SER CALC-MCNC: 3.6 G/DL (ref 2–4)
GLUCOSE SERPL-MCNC: 104 MG/DL (ref 65–100)
HCT VFR BLD AUTO: 37.7 % (ref 35–47)
HGB BLD-MCNC: 12.3 G/DL (ref 11.5–16)
IMM GRANULOCYTES # BLD AUTO: 0 K/UL
IMM GRANULOCYTES NFR BLD AUTO: 0 %
LYMPHOCYTES # BLD: 1 K/UL (ref 0.8–3.5)
LYMPHOCYTES NFR BLD: 34 % (ref 12–49)
MAGNESIUM SERPL-MCNC: 2.1 MG/DL (ref 1.6–2.4)
MCH RBC QN AUTO: 30.8 PG (ref 26–34)
MCHC RBC AUTO-ENTMCNC: 32.6 G/DL (ref 30–36.5)
MCV RBC AUTO: 94.5 FL (ref 80–99)
MONOCYTES # BLD: 0.2 K/UL (ref 0–1)
MONOCYTES NFR BLD: 6 % (ref 5–13)
NEUTS SEG # BLD: 1.6 K/UL (ref 1.8–8)
NEUTS SEG NFR BLD: 55 % (ref 32–75)
NRBC # BLD: 0 K/UL (ref 0–0.01)
NRBC BLD-RTO: 0 PER 100 WBC
PHOSPHATE SERPL-MCNC: 3.6 MG/DL (ref 2.6–4.7)
PLATELET # BLD AUTO: 171 K/UL (ref 150–400)
PLATELET COMMENTS,PCOM: ABNORMAL
PMV BLD AUTO: 11.7 FL (ref 8.9–12.9)
POTASSIUM SERPL-SCNC: 3.8 MMOL/L (ref 3.5–5.1)
PROT SERPL-MCNC: 7.2 G/DL (ref 6.4–8.2)
RBC # BLD AUTO: 3.99 M/UL (ref 3.8–5.2)
RBC MORPH BLD: ABNORMAL
RBC MORPH BLD: ABNORMAL
SODIUM SERPL-SCNC: 141 MMOL/L (ref 136–145)
WBC # BLD AUTO: 3 K/UL (ref 3.6–11)

## 2019-05-22 PROCEDURE — 77030012965 HC NDL HUBR BBMI -A

## 2019-05-22 PROCEDURE — 74011000250 HC RX REV CODE- 250: Performed by: NURSE PRACTITIONER

## 2019-05-22 PROCEDURE — 86300 IMMUNOASSAY TUMOR CA 15-3: CPT

## 2019-05-22 PROCEDURE — 85025 COMPLETE CBC W/AUTO DIFF WBC: CPT

## 2019-05-22 PROCEDURE — 84100 ASSAY OF PHOSPHORUS: CPT

## 2019-05-22 PROCEDURE — 83735 ASSAY OF MAGNESIUM: CPT

## 2019-05-22 PROCEDURE — 74011250636 HC RX REV CODE- 250/636: Performed by: NURSE PRACTITIONER

## 2019-05-22 PROCEDURE — 80053 COMPREHEN METABOLIC PANEL: CPT

## 2019-05-22 PROCEDURE — 96372 THER/PROPH/DIAG INJ SC/IM: CPT

## 2019-05-22 PROCEDURE — 36415 COLL VENOUS BLD VENIPUNCTURE: CPT

## 2019-05-22 PROCEDURE — 36591 DRAW BLOOD OFF VENOUS DEVICE: CPT

## 2019-05-22 RX ORDER — SODIUM CHLORIDE 9 MG/ML
10 INJECTION INTRAMUSCULAR; INTRAVENOUS; SUBCUTANEOUS AS NEEDED
Status: DISPENSED | OUTPATIENT
Start: 2019-05-22 | End: 2019-05-22

## 2019-05-22 RX ORDER — SODIUM CHLORIDE 0.9 % (FLUSH) 0.9 %
10 SYRINGE (ML) INJECTION AS NEEDED
Status: DISPENSED | OUTPATIENT
Start: 2019-05-22 | End: 2019-05-22

## 2019-05-22 RX ORDER — HEPARIN 100 UNIT/ML
300-500 SYRINGE INTRAVENOUS AS NEEDED
Status: DISPENSED | OUTPATIENT
Start: 2019-05-22 | End: 2019-05-22

## 2019-05-22 RX ADMIN — DENOSUMAB 120 MG: 120 INJECTION SUBCUTANEOUS at 12:50

## 2019-05-22 RX ADMIN — SODIUM CHLORIDE 10 ML: 9 INJECTION, SOLUTION INTRAMUSCULAR; INTRAVENOUS; SUBCUTANEOUS at 11:15

## 2019-05-22 RX ADMIN — Medication 10 ML: at 11:19

## 2019-05-22 RX ADMIN — SODIUM CHLORIDE, PRESERVATIVE FREE 500 UNITS: 5 INJECTION INTRAVENOUS at 11:19

## 2019-05-22 NOTE — PROGRESS NOTES
[facility] [place] [facility] [place] [address] [address], [place]: [phone]  F: [phone]    Reason for Visit:   Sukhjinder Adames is a 52 y.o. female who is seen for follow up of metastatic breast cancer on Ibrance/Femara/Xgeva. Treatment History:   Anastrozole/Ibrance/Xgeva [place] 3/18 in [place]  STAGE: dx 8/17 4 with L2 bone mets biopsied in [place]  Prior stage 1 RIGHT breast cancer 2016 lump/XRT Tamoxifen    History of Present Illness:   Patient seen today for office for f/u metastatic breast cancer on Ibrance/Femara/Xgeva. Patient is feeling well overall. She is tolerating Ibrance well. She will finish current cycle next Tuesday 5/28/19. She had CTs 11/18 here with nonspecific liver mets/ stable. CTs 3/19 done at [facility] stable/good with no liver lesions. Patient's labs have been good/stable on Ibrance. Labs pending today. She reports that her appetite and energy levels are good. She joined [employer] and is exercising every day. She and her  also take walks together. She is planning to move back to [facility]. now that she is retired and wants a recommendation for Oncologist there. Patient denies fever, chills, cough, shortness of breath, chest pain, nausea, vomiting, diarrhea, constipation, and abdominal pain. She is here alone today. Oncologic Hx:  Metastatic breast cancer ER+ HER2 negative seen and treated in [place]. Patient initially dx in 2011 with ? Met breast cancer to liver. Had chemo IV then Xeloda. Had Lupron. Patient had mammo 2015 which was abnormal and then had breast biopsy and breast cancer. Then patient had breast surgery/ XRT. During this time patient states liver mets \"went away\". F/u visit 2017 showed bone met lumbar spine. Patient was on Arimidex. Patient is currently on Femara/Ibrance/Xgeva since 3/18. Last PET 3/18 negative except bone. Liver contour abnormalities thought to be cirrhosis. Patient has a port.    CTs 3/19 at [facility] stable    Past Medical History:   Diagnosis Date    Arthritis     [facility] ([facility] [address]     Diabetes ([facility] [address]       Past Surgical History:   Procedure Laterality Date    BREAST SURGERY PROCEDURE UNLISTED      partial mascetomies    HX LAP CHOLECYSTECTOMY        Social History     Tobacco Use    Smoking status: Never Smoker    Smokeless tobacco: Never Used   Substance Use Topics    Alcohol use: Yes     Comment: socially       Family History   Problem Relation Age of Onset    Cancer Mother     Arthritis-osteo Mother     Hypertension Mother     Heart Disease Father     Hypertension Sister     Hypertension Brother      Current Outpatient Medications   Medication Sig    cetirizine (ZYRTEC) 10 mg tablet Take 10 mg by mouth.  magnesium 250 mg tab Take  by mouth daily.  aspirin delayed-release 81 mg tablet Take  by mouth daily.  letrozole (FEMARA) 2.5 mg tablet Take 1 Tab by mouth daily.  FEMARA 2.5 mg tablet TK 1 T PO QD    palbociclib 75 mg cap Take 1 capsule (75mg) daily for 21 days on followed by 7 days off    multivitamin (ONE A DAY) tablet multivitamin    B infantis/B ani/B ivory/B bifid (PROBIOTIC 4X PO) Probiotic    denosumab (XGEVA SC) Xgeva    ascorbic acid, vitamin C, (VITAMIN C) 250 mg tablet Take 500 mg by mouth two (2) times a day.  calcium-cholecalciferol, D3, (CALTRATE 600+D) tablet Take 1 Tab by mouth daily.  metFORMIN (GLUCOPHAGE) 500 mg tablet Take  by mouth daily. No current facility-administered medications for this visit. Allergies   Allergen Reactions    Adhesive Rash      Review of Systems: A complete review of systems was obtained, negative except as described above.     Physical Exam:     Visit Vitals  /85 (BP 1 Location: Left arm, BP Patient Position: Sitting)   Pulse 63   Temp 98.2 °F (36.8 °C) (Oral)   Resp 18   Ht 5' 4\" (1.626 m)   Wt 240 lb 6.4 oz (109 kg)   SpO2 95%   BMI 41.26 kg/m²     ECOG PS: 0  General: No distress  Eyes: PERRLA, anicteric sclerae  HENT: Atraumatic, OP clear  Neck: Supple  Lymphatic: No cervical, supraclavicular  Respiratory: CTAB, normal respiratory effort  CV: Normal rate, regular rhythm, no murmurs, no peripheral edema  GI: Soft, nondistended  MS: Normal gait and station. Skin: No rashes, ecchymoses, or petechiae. Normal temperature, turgor, and texture. Psych: Alert, oriented, appropriate affect, normal judgment/insight    Results:     Lab Results   Component Value Date/Time    WBC 3.0 (L) 05/22/2019 11:18 AM    HGB 12.3 05/22/2019 11:18 AM    HCT 37.7 05/22/2019 11:18 AM    PLATELET 779 61/37/6389 11:18 AM    MCV 94.5 05/22/2019 11:18 AM    ABS. NEUTROPHILS 1.6 (L) 05/22/2019 11:18 AM     Lab Results   Component Value Date/Time    Sodium 141 05/22/2019 11:18 AM    Potassium 3.8 05/22/2019 11:18 AM    Chloride 109 (H) 05/22/2019 11:18 AM    CO2 29 05/22/2019 11:18 AM    Glucose 104 (H) 05/22/2019 11:18 AM    BUN 27 (H) 05/22/2019 11:18 AM    Creatinine 1.14 (H) 05/22/2019 11:18 AM    GFR est AA 60 (L) 05/22/2019 11:18 AM    GFR est non-AA 49 (L) 05/22/2019 11:18 AM    Calcium 9.4 05/22/2019 11:18 AM     Lab Results   Component Value Date/Time    Bilirubin, total 0.7 05/22/2019 11:18 AM    ALT (SGPT) 31 05/22/2019 11:18 AM    AST (SGOT) 40 (H) 05/22/2019 11:18 AM    Alk. phosphatase 43 (L) 05/22/2019 11:18 AM    Protein, total 7.2 05/22/2019 11:18 AM    Albumin 3.6 05/22/2019 11:18 AM    Globulin 3.6 05/22/2019 11:18 AM     CT Results (most recent):  Results from [place] encounter on 11/07/18   CT ABD PELV W CONT    Addendum Addendum: Comparison was made to a PET/CT dated 3/9/2018 and abdominal CT  dated 2/9/2018 from [facility] By LAZARO Soto MD 11/20/2018  5:00 PM          Narrative EXAM:  CT CHEST W CONT, CT ABD PELV W CONT    INDICATION: restaging metastatic breast cancer    COMPARISON: 2018    CONTRAST:  100 mL of Isovue-370.     TECHNIQUE:   Following the uneventful intravenous administration of contrast, thin axial  images were obtained through the chest, abdomen and pelvis. Coronal and sagittal  reconstructions were generated. Oral contrast was not administered. CT dose  reduction was achieved through use of a standardized protocol tailored for this  examination and automatic exposure control for dose modulation. FINDINGS:   Postoperative changes are noted in the right breast.  THYROID: No nodule. MEDIASTINUM: 1.4 cm pretracheal lymph node is stable. ABDON: No mass or lymphadenopathy. THORACIC AORTA: No dissection or aneurysm. MAIN PULMONARY ARTERY: Normal in caliber. TRACHEA/BRONCHI: Patent. ESOPHAGUS: No wall thickening or dilatation. HEART: Normal in size. PLEURA: No effusion or pneumothorax. LUNGS: 2 mm nodule at the right lung base is stable. No left pulmonary nodules  identified. LIVER: Lobulation of the liver most consistent with cirrhosis is unchanged. There is questionable 8mm low density in the liver just beneath the dome of the  diaphragm. GALLBLADDER: Surgically absent  SPLEEN: No mass. PANCREAS: No mass or ductal dilatation. ADRENALS: Unremarkable. KIDNEYS: No mass, calculus, or hydronephrosis. STOMACH: Unremarkable. SMALL BOWEL: No dilatation or wall thickening. COLON: No dilatation or wall thickening. APPENDIX: Unremarkable. PERITONEUM: No ascites or pneumoperitoneum. RETROPERITONEUM: No lymphadenopathy or aortic aneurysm. REPRODUCTIVE ORGANS: The uterus is lobulated with multiple masses again noted  consistent with multiple leiomyomata. There is a 2.4 x 3.9 cm low-density  centrally could represent prominent endometrium. CT dated 2/9/2018 did not have  images of this area URINARY BLADDER: No mass or calculus. BONES: Sclerotic lesion L1 is unchanged  ADDITIONAL COMMENTS: N/A      Impression IMPRESSION:  CT the chest demonstrates no definite evidence of metastatic disease. Postoperative changes are noted right breast are stable. . 2 mm nodule right lung  base is unchanged. Sclerotic lesion at L1 is unchanged. Liver is irregular in contour suspicious for cirrhosis which is unchanged. There  is an 8 mm vague low-density in the liver just at the dome of diaphragm segment  8 which is equivocal and follow-up studies would be helpful to assess for  interval change. Lobular contour of the uterus with multiple mass lesions consistent with  leiomyomata are again noted. There is low-density centrally measuring 2.4 x 4 cm  within the uterus which could represent prominent endometrium versus low-density  leiomyoma. No images of the pelvis from the CT dated 2/9/2018 are included. Records reviewed and summarized above. Pathology report(s) reviewed above. Radiology report(s) reviewed above. Assessment/PLAN:   1)  Metastatic Breast Cancer ER+ HER2 negative to bones and liver from [place]. Prior stage 1 breast cancer 2011 and 2016. Patient reports prior hx of liver mets with hx of chemo IV and Xeloda. No complete records from [place] onocology. Patient is a good historian. No evidence of liver mets now per patient. Last PET 3/18 negative except for bone mets. Patient is doing well on current regimen of Femara/Ibrance/Xgeva. CTs 11/18 show nonspecific liver disease. Present on prior CTs. CTs 3/19/19 at [facility] due to cost per patient. CTs stable. No liver lesions present. Reviewed with patient at last visit. Next CTs due 7/19, will order today. Patient clinically has no pain and is doing well overall. Will continue monthly labs with PF. Labs pending today. 2) Hx of Reported Liver Mets with changes on CT chronically/Lung Nodule  CT here with possible liver lesion 8mm. CTs 3/19/19 at [facility] [address] due to cost? CTs 3/19 with no liver lesion. CTs stable and reviewed with patient at last visit. 3)  Possible preDM  Management per PCP. 4) Psychosocial  Mood good. Coping well. Retired in 2/19 and is happy about this. Enjoying residential thus far.   [facility] and is exercising daily. Good support from family. Planning to move back to [facility] by 9/19. Here alone today. Call if questions. Labs monthly and f/u here in 2 months. This patient was seen in conjunction with Bonnie Brooke NP. I appreciate the opportunity to participate in Ms. [address] [facility].     Signed By: Eather Homans, DO

## 2019-05-22 NOTE — PROGRESS NOTES
[address] [facility] @ [facility] VISIT NOTE     1100  Patient arrives for q 4 weeks Xgeva SQ injection without acute problems. Please see connect care for complete assessment and education provided. Vital signs stable throughout and prior to discharge, Pt. Tolerated treatment well and discharged without incident. Patient is aware of next [facility] appointment on 06/26/2019 @ 11:00 AM. Appointment card given to patient. Medications Verified by Claus Alvarado RN via [employer]:  1. Xgeva 120 mg SQ   2. Heparin 500 units IV/port     VITAL SIGNS   Patient Vitals for the past 12 hrs:   Temp Pulse Resp BP SpO2   05/22/19 1110 -- -- -- -- 97 %   05/22/19 1102 98 °F (36.7 °C) 64 16 120/73 97 %          LAB WORK   Recent Results (from the past 12 hour(s))   CBC WITH AUTOMATED DIFF    Collection Time: 05/22/19 11:18 AM   Result Value Ref Range    WBC 3.0 (L) 3.6 - 11.0 K/uL    RBC 3.99 3.80 - 5.20 M/uL    HGB 12.3 11.5 - 16.0 g/dL    HCT 37.7 35.0 - 47.0 %    MCV 94.5 80.0 - 99.0 FL    MCH 30.8 26.0 - 34.0 PG    MCHC 32.6 30.0 - 36.5 g/dL    RDW 14.7 (H) 11.5 - 14.5 %    PLATELET 003 738 - 286 K/uL    MPV 11.7 8.9 - 12.9 FL    NRBC 0.0 0  WBC    ABSOLUTE NRBC 0.00 0.00 - 0.01 K/uL    NEUTROPHILS 55 32 - 75 %    LYMPHOCYTES 34 12 - 49 %    MONOCYTES 6 5 - 13 %    EOSINOPHILS 2 0 - 7 %    BASOPHILS 3 (H) 0 - 1 %    IMMATURE GRANULOCYTES 0 %    ABS. NEUTROPHILS 1.6 (L) 1.8 - 8.0 K/UL    ABS. LYMPHOCYTES 1.0 0.8 - 3.5 K/UL    ABS. MONOCYTES 0.2 0.0 - 1.0 K/UL    ABS. EOSINOPHILS 0.1 0.0 - 0.4 K/UL    ABS. BASOPHILS 0.1 0.0 - 0.1 K/UL    ABS. IMM.  GRANS. 0.0 K/UL    DF MANUAL      PLATELET COMMENTS Large Platelets      RBC COMMENTS ANISOCYTOSIS  1+        RBC COMMENTS OVALOCYTES  PRESENT       MAGNESIUM    Collection Time: 05/22/19 11:18 AM   Result Value Ref Range    Magnesium 2.1 1.6 - 2.4 mg/dL   PHOSPHORUS    Collection Time: 05/22/19 11:18 AM   Result Value Ref Range    Phosphorus 3.6 2.6 - 4.7 MG/DL   METABOLIC PANEL, COMPREHENSIVE    Collection Time: 05/22/19 11:18 AM   Result Value Ref Range    Sodium 141 136 - 145 mmol/L    Potassium 3.8 3.5 - 5.1 mmol/L    Chloride 109 (H) 97 - 108 mmol/L    CO2 29 21 - 32 mmol/L    Anion gap 3 (L) 5 - 15 mmol/L    Glucose 104 (H) 65 - 100 mg/dL    BUN 27 (H) 6 - 20 MG/DL    Creatinine 1.14 (H) 0.55 - 1.02 MG/DL    BUN/Creatinine ratio 24 (H) 12 - 20      GFR est AA 60 (L) >60 ml/min/1.73m2    GFR est non-AA 49 (L) >60 ml/min/1.73m2    Calcium 9.4 8.5 - 10.1 MG/DL    Bilirubin, total 0.7 0.2 - 1.0 MG/DL    ALT (SGPT) 31 12 - 78 U/L    AST (SGOT) 40 (H) 15 - 37 U/L    Alk.  phosphatase 43 (L) 45 - 117 U/L    Protein, total 7.2 6.4 - 8.2 g/dL    Albumin 3.6 3.5 - 5.0 g/dL    Globulin 3.6 2.0 - 4.0 g/dL    A-G Ratio 1.0 (L) 1.1 - 2.2

## 2019-05-22 NOTE — PROGRESS NOTES
Anai Ramesh is a 52 y.o. female     Chief Complaint   Patient presents with    Breast Cancer     infusion day       Visit Vitals  /85 (BP 1 Location: Left arm, BP Patient Position: Sitting)   Pulse 63   Temp 98.2 °F (36.8 °C) (Oral)   Resp 18   Ht 5' 4\" (1.626 m)   Wt 240 lb 6.4 oz (109 kg)   SpO2 95%   BMI 41.26 kg/m²       Health Maintenance Due   Topic Date Due    Hepatitis C Screening  1965    Pneumococcal 0-64 years (1 of 3 - PCV13) 07/28/1969    DTaP/Tdap/Td series (1 - Tdap) 07/28/1984    PAP AKA CERVICAL CYTOLOGY  07/28/1984    Shingrix Vaccine Age 50> (1 of 2) 07/28/2013    BREAST CANCER SCRN MAMMOGRAM  07/28/2013    FOBT Q 1 YEAR AGE 50-75  07/28/2013       1. Have you been to the ER, urgent care clinic since your last visit? Hospitalized since your last visit? No    2. Have you seen or consulted any other health care providers outside of the [address] [place] [address] since your last visit? Include any pap smears or colon screening.  No

## 2019-05-22 NOTE — LETTER
5/27/19 Patient: Lesli Almaguer YOB: 1965 Date of Visit: 5/22/2019 Norm Bhandari MD 
[id number] [address] VIA Facsimile: [phone] Dear Norm Bhandari MD, Thank you for referring Ms. Leno Schofield to [address] [place] [address] for evaluation. My notes for this consultation are attached. If you have questions, please do not hesitate to call me. I look forward to following your patient along with you. Sincerely, Angelique Jc NP

## 2019-05-23 LAB — CANCER AG27-29 SERPL-ACNC: 53.5 U/ML (ref 0–38.6)

## 2019-06-26 ENCOUNTER — HOSPITAL ENCOUNTER (OUTPATIENT)
Dept: INFUSION THERAPY | Age: 54
Discharge: HOME | End: 2019-06-26
Payer: COMMERCIAL

## 2019-06-26 VITALS
SYSTOLIC BLOOD PRESSURE: 142 MMHG | RESPIRATION RATE: 16 BRPM | TEMPERATURE: 98.2 F | DIASTOLIC BLOOD PRESSURE: 85 MMHG | HEART RATE: 60 BPM | OXYGEN SATURATION: 98 %

## 2019-06-26 DIAGNOSIS — C79.51 CANCER, METASTATIC TO BONE (HCC): ICD-10-CM

## 2019-06-26 DIAGNOSIS — C50.919 METASTATIC BREAST CANCER (HCC): Primary | ICD-10-CM

## 2019-06-26 LAB
ALBUMIN SERPL-MCNC: 3.9 G/DL (ref 3.5–5)
ALBUMIN/GLOB SERPL: 1.1 {RATIO} (ref 1.1–2.2)
ALP SERPL-CCNC: 48 U/L (ref 45–117)
ALT SERPL-CCNC: 24 U/L (ref 12–78)
ANION GAP SERPL CALC-SCNC: 6 MMOL/L (ref 5–15)
AST SERPL-CCNC: 23 U/L (ref 15–37)
BASOPHILS # BLD: 0.1 K/UL (ref 0–0.1)
BASOPHILS NFR BLD: 2 % (ref 0–1)
BILIRUB SERPL-MCNC: 0.9 MG/DL (ref 0.2–1)
BUN SERPL-MCNC: 21 MG/DL (ref 6–20)
BUN/CREAT SERPL: 19 (ref 12–20)
CALCIUM SERPL-MCNC: 9.8 MG/DL (ref 8.5–10.1)
CHLORIDE SERPL-SCNC: 107 MMOL/L (ref 97–108)
CO2 SERPL-SCNC: 28 MMOL/L (ref 21–32)
CREAT SERPL-MCNC: 1.12 MG/DL (ref 0.55–1.02)
DIFFERENTIAL METHOD BLD: ABNORMAL
EOSINOPHIL # BLD: 0.1 K/UL (ref 0–0.4)
EOSINOPHIL NFR BLD: 2 % (ref 0–7)
ERYTHROCYTE [DISTWIDTH] IN BLOOD BY AUTOMATED COUNT: 14.9 % (ref 11.5–14.5)
GLOBULIN SER CALC-MCNC: 3.7 G/DL (ref 2–4)
GLUCOSE SERPL-MCNC: 120 MG/DL (ref 65–100)
HCT VFR BLD AUTO: 39.7 % (ref 35–47)
HGB BLD-MCNC: 13.1 G/DL (ref 11.5–16)
IMM GRANULOCYTES # BLD AUTO: 0 K/UL
IMM GRANULOCYTES NFR BLD AUTO: 0 %
LYMPHOCYTES # BLD: 1.4 K/UL (ref 0.8–3.5)
LYMPHOCYTES NFR BLD: 45 % (ref 12–49)
MAGNESIUM SERPL-MCNC: 1.9 MG/DL (ref 1.6–2.4)
MCH RBC QN AUTO: 30.7 PG (ref 26–34)
MCHC RBC AUTO-ENTMCNC: 33 G/DL (ref 30–36.5)
MCV RBC AUTO: 93 FL (ref 80–99)
MONOCYTES # BLD: 0.2 K/UL (ref 0–1)
MONOCYTES NFR BLD: 5 % (ref 5–13)
NEUTS SEG # BLD: 1.3 K/UL (ref 1.8–8)
NEUTS SEG NFR BLD: 46 % (ref 32–75)
NRBC # BLD: 0 K/UL (ref 0–0.01)
NRBC BLD-RTO: 0 PER 100 WBC
PHOSPHATE SERPL-MCNC: 3.6 MG/DL (ref 2.6–4.7)
PLATELET # BLD AUTO: 144 K/UL (ref 150–400)
PMV BLD AUTO: 11.3 FL (ref 8.9–12.9)
POTASSIUM SERPL-SCNC: 3.7 MMOL/L (ref 3.5–5.1)
PROT SERPL-MCNC: 7.6 G/DL (ref 6.4–8.2)
RBC # BLD AUTO: 4.27 M/UL (ref 3.8–5.2)
RBC MORPH BLD: ABNORMAL
SODIUM SERPL-SCNC: 141 MMOL/L (ref 136–145)
WBC # BLD AUTO: 3.1 K/UL (ref 3.6–11)

## 2019-06-26 PROCEDURE — 77030012965 HC NDL HUBR BBMI -A

## 2019-06-26 PROCEDURE — 96372 THER/PROPH/DIAG INJ SC/IM: CPT

## 2019-06-26 PROCEDURE — 86300 IMMUNOASSAY TUMOR CA 15-3: CPT

## 2019-06-26 PROCEDURE — 85025 COMPLETE CBC W/AUTO DIFF WBC: CPT

## 2019-06-26 PROCEDURE — 83735 ASSAY OF MAGNESIUM: CPT

## 2019-06-26 PROCEDURE — 36591 DRAW BLOOD OFF VENOUS DEVICE: CPT

## 2019-06-26 PROCEDURE — 74011000250 HC RX REV CODE- 250: Performed by: NURSE PRACTITIONER

## 2019-06-26 PROCEDURE — 74011250636 HC RX REV CODE- 250/636: Performed by: NURSE PRACTITIONER

## 2019-06-26 PROCEDURE — 80053 COMPREHEN METABOLIC PANEL: CPT

## 2019-06-26 PROCEDURE — 84100 ASSAY OF PHOSPHORUS: CPT

## 2019-06-26 PROCEDURE — 36415 COLL VENOUS BLD VENIPUNCTURE: CPT

## 2019-06-26 RX ORDER — SODIUM CHLORIDE 9 MG/ML
10 INJECTION INTRAMUSCULAR; INTRAVENOUS; SUBCUTANEOUS AS NEEDED
Status: ACTIVE | OUTPATIENT
Start: 2019-06-26 | End: 2019-06-26

## 2019-06-26 RX ORDER — SODIUM CHLORIDE 0.9 % (FLUSH) 0.9 %
10 SYRINGE (ML) INJECTION AS NEEDED
Status: ACTIVE | OUTPATIENT
Start: 2019-06-26 | End: 2019-06-26

## 2019-06-26 RX ORDER — HEPARIN 100 UNIT/ML
300-500 SYRINGE INTRAVENOUS AS NEEDED
Status: ACTIVE | OUTPATIENT
Start: 2019-06-26 | End: 2019-06-26

## 2019-06-26 RX ADMIN — DENOSUMAB 120 MG: 120 INJECTION SUBCUTANEOUS at 12:27

## 2019-06-26 RX ADMIN — Medication 500 UNITS: at 11:13

## 2019-06-26 RX ADMIN — Medication 10 ML: at 11:13

## 2019-06-26 RX ADMIN — SODIUM CHLORIDE 10 ML: 9 INJECTION, SOLUTION INTRAMUSCULAR; INTRAVENOUS; SUBCUTANEOUS at 11:10

## 2019-06-26 NOTE — PROGRESS NOTES
[address] [facility] @ [facility] VISIT NOTE     1100 Patient arrives for q 4 week Xgeva SQ injection without acute problems. Please see connect care for complete assessment and education provided. Vital signs stable throughout and prior to discharge, Pt. Tolerated treatment well and discharged without incident. Patient is aware of next hospitals appointment on 07/24/2019 @ 1:00 PM. Appointment card given to patient. Medications Verified by Mikaela Brooks RN via [employer]:  1. Xgeva 120 mg SQ  2. Heparin 500 units IV flush via port  3. NS flush  via port       VITAL SIGNS   Patient Vitals for the past 12 hrs:   Temp Pulse Resp BP SpO2   06/26/19 1104 98.2 °F (36.8 °C) 60 16 142/85 --   06/26/19 1100 -- -- -- -- 98 %          LAB WORK Labs Pending, please see connect care for results. Recent Results (from the past 12 hour(s))   CBC WITH AUTOMATED DIFF    Collection Time: 06/26/19 11:08 AM   Result Value Ref Range    WBC 3.1 (L) 3.6 - 11.0 K/uL    RBC 4.27 3.80 - 5.20 M/uL    HGB 13.1 11.5 - 16.0 g/dL    HCT 39.7 35.0 - 47.0 %    MCV 93.0 80.0 - 99.0 FL    MCH 30.7 26.0 - 34.0 PG    MCHC 33.0 30.0 - 36.5 g/dL    RDW 14.9 (H) 11.5 - 14.5 %    PLATELET 721 (L) 890 - 400 K/uL    MPV 11.3 8.9 - 12.9 FL    NRBC 0.0 0  WBC    ABSOLUTE NRBC 0.00 0.00 - 0.01 K/uL    NEUTROPHILS 46 32 - 75 %    LYMPHOCYTES 45 12 - 49 %    MONOCYTES 5 5 - 13 %    EOSINOPHILS 2 0 - 7 %    BASOPHILS 2 (H) 0 - 1 %    IMMATURE GRANULOCYTES 0 %    ABS. NEUTROPHILS 1.3 (L) 1.8 - 8.0 K/UL    ABS. LYMPHOCYTES 1.4 0.8 - 3.5 K/UL    ABS. MONOCYTES 0.2 0.0 - 1.0 K/UL    ABS. EOSINOPHILS 0.1 0.0 - 0.4 K/UL    ABS. BASOPHILS 0.1 0.0 - 0.1 K/UL    ABS. IMM.  GRANS. 0.0 K/UL    DF MANUAL      RBC COMMENTS ANISOCYTOSIS  1+       METABOLIC PANEL, COMPREHENSIVE    Collection Time: 06/26/19 11:08 AM   Result Value Ref Range    Sodium 141 136 - 145 mmol/L    Potassium 3.7 3.5 - 5.1 mmol/L    Chloride 107 97 - 108 mmol/L    CO2 28 21 - 32 mmol/L    Anion gap 6 5 - 15 mmol/L    Glucose 120 (H) 65 - 100 mg/dL    BUN 21 (H) 6 - 20 MG/DL    Creatinine 1.12 (H) 0.55 - 1.02 MG/DL    BUN/Creatinine ratio 19 12 - 20      GFR est AA >60 >60 ml/min/1.73m2    GFR est non-AA 51 (L) >60 ml/min/1.73m2    Calcium 9.8 8.5 - 10.1 MG/DL    Bilirubin, total 0.9 0.2 - 1.0 MG/DL    ALT (SGPT) 24 12 - 78 U/L    AST (SGOT) 23 15 - 37 U/L    Alk.  phosphatase 48 45 - 117 U/L    Protein, total 7.6 6.4 - 8.2 g/dL    Albumin 3.9 3.5 - 5.0 g/dL    Globulin 3.7 2.0 - 4.0 g/dL    A-G Ratio 1.1 1.1 - 2.2     MAGNESIUM    Collection Time: 06/26/19 11:08 AM   Result Value Ref Range    Magnesium 1.9 1.6 - 2.4 mg/dL   PHOSPHORUS    Collection Time: 06/26/19 11:08 AM   Result Value Ref Range    Phosphorus 3.6 2.6 - 4.7 MG/DL

## 2019-06-27 LAB — CANCER AG27-29 SERPL-ACNC: 80.6 U/ML (ref 0–38.6)

## 2019-07-09 ENCOUNTER — TELEPHONE (OUTPATIENT)
Dept: ONCOLOGY | Age: 54
End: 2019-07-09

## 2019-07-09 NOTE — TELEPHONE ENCOUNTER
Elisabeth from [facility] called and stated that CT's have been denied    Peer To Peer    [employer]       CB# [phone]    Reference# [id number]

## 2019-07-10 NOTE — TELEPHONE ENCOUNTER
Peer to Peer completed with Dr. Greyson Hand. CTs approved. Authorization #[id number]. Valid for 90 business days.

## 2019-07-15 ENCOUNTER — TELEPHONE (OUTPATIENT)
Dept: ONCOLOGY | Age: 54
End: 2019-07-15

## 2019-07-15 NOTE — TELEPHONE ENCOUNTER
CT scan was approved. HIPPA verified. Advised that scan approved. Patient declined  Approval #. Will call scheduling and would like 7/23/19. Patient moving to [place] and will be here for [facility] visit 7/24/19. Patient would like Dr. Newton Sanchez recommendations for oncologist in [place] area. Verbalized understanding. Forwarded to provider and oncology scheduling.

## 2019-07-15 NOTE — TELEPHONE ENCOUNTER
Patient called and stated that she was supposed to have a CAT scan 7/10 and insurance denied it. Patient would like a call back to discuss this.  # [phone]

## 2019-07-24 ENCOUNTER — HOSPITAL ENCOUNTER (OUTPATIENT)
Dept: CT IMAGING | Age: 54
Discharge: HOME | End: 2019-07-24
Payer: COMMERCIAL

## 2019-07-24 ENCOUNTER — DOCUMENTATION ONLY (OUTPATIENT)
Dept: ONCOLOGY | Age: 54
End: 2019-07-24

## 2019-07-24 ENCOUNTER — HOSPITAL ENCOUNTER (OUTPATIENT)
Dept: CT IMAGING | Age: 54
Discharge: HOME | End: 2019-07-24

## 2019-07-24 ENCOUNTER — HOSPITAL ENCOUNTER (OUTPATIENT)
Dept: INFUSION THERAPY | Age: 54
Discharge: HOME | End: 2019-07-24
Payer: COMMERCIAL

## 2019-07-24 ENCOUNTER — OFFICE VISIT (OUTPATIENT)
Dept: ONCOLOGY | Age: 54
End: 2019-07-24

## 2019-07-24 VITALS
BODY MASS INDEX: 40.63 KG/M2 | WEIGHT: 238 LBS | HEART RATE: 56 BPM | DIASTOLIC BLOOD PRESSURE: 85 MMHG | HEIGHT: 64 IN | OXYGEN SATURATION: 98 % | TEMPERATURE: 97.6 F | SYSTOLIC BLOOD PRESSURE: 135 MMHG

## 2019-07-24 VITALS
SYSTOLIC BLOOD PRESSURE: 142 MMHG | DIASTOLIC BLOOD PRESSURE: 88 MMHG | HEART RATE: 62 BPM | RESPIRATION RATE: 16 BRPM | OXYGEN SATURATION: 98 % | TEMPERATURE: 98.1 F

## 2019-07-24 DIAGNOSIS — C50.919 METASTATIC BREAST CANCER (HCC): Primary | ICD-10-CM

## 2019-07-24 DIAGNOSIS — C78.7 LIVER METASTASES (HCC): ICD-10-CM

## 2019-07-24 DIAGNOSIS — C79.51 CANCER, METASTATIC TO BONE (HCC): ICD-10-CM

## 2019-07-24 DIAGNOSIS — R91.1 LUNG NODULE: ICD-10-CM

## 2019-07-24 DIAGNOSIS — R73.03 PRE-DIABETES: ICD-10-CM

## 2019-07-24 DIAGNOSIS — C50.919 METASTATIC BREAST CANCER (HCC): ICD-10-CM

## 2019-07-24 DIAGNOSIS — Z51.81 THERAPEUTIC DRUG MONITORING: ICD-10-CM

## 2019-07-24 DIAGNOSIS — D70.2 DRUG-INDUCED LEUKOPENIA (HCC): ICD-10-CM

## 2019-07-24 LAB
ALBUMIN SERPL-MCNC: 3.8 G/DL (ref 3.5–5)
ALBUMIN/GLOB SERPL: 1.1 {RATIO} (ref 1.1–2.2)
ALP SERPL-CCNC: 55 U/L (ref 45–117)
ALT SERPL-CCNC: 22 U/L (ref 12–78)
ANION GAP SERPL CALC-SCNC: 6 MMOL/L (ref 5–15)
AST SERPL-CCNC: 25 U/L (ref 15–37)
BASOPHILS # BLD: 0.1 K/UL (ref 0–0.1)
BASOPHILS NFR BLD: 3 % (ref 0–1)
BILIRUB SERPL-MCNC: 0.6 MG/DL (ref 0.2–1)
BUN SERPL-MCNC: 15 MG/DL (ref 6–20)
BUN/CREAT SERPL: 13 (ref 12–20)
CALCIUM SERPL-MCNC: 9.7 MG/DL (ref 8.5–10.1)
CHLORIDE SERPL-SCNC: 104 MMOL/L (ref 97–108)
CO2 SERPL-SCNC: 30 MMOL/L (ref 21–32)
CREAT SERPL-MCNC: 1.15 MG/DL (ref 0.55–1.02)
DIFFERENTIAL METHOD BLD: ABNORMAL
EOSINOPHIL # BLD: 0 K/UL (ref 0–0.4)
EOSINOPHIL NFR BLD: 0 % (ref 0–7)
ERYTHROCYTE [DISTWIDTH] IN BLOOD BY AUTOMATED COUNT: 14.9 % (ref 11.5–14.5)
GLOBULIN SER CALC-MCNC: 3.6 G/DL (ref 2–4)
GLUCOSE SERPL-MCNC: 98 MG/DL (ref 65–100)
HCT VFR BLD AUTO: 38.5 % (ref 35–47)
HGB BLD-MCNC: 12.6 G/DL (ref 11.5–16)
IMM GRANULOCYTES # BLD AUTO: 0 K/UL
IMM GRANULOCYTES NFR BLD AUTO: 0 %
LYMPHOCYTES # BLD: 1.9 K/UL (ref 0.8–3.5)
LYMPHOCYTES NFR BLD: 63 % (ref 12–49)
MAGNESIUM SERPL-MCNC: 2.2 MG/DL (ref 1.6–2.4)
MCH RBC QN AUTO: 30.8 PG (ref 26–34)
MCHC RBC AUTO-ENTMCNC: 32.7 G/DL (ref 30–36.5)
MCV RBC AUTO: 94.1 FL (ref 80–99)
MONOCYTES # BLD: 0.2 K/UL (ref 0–1)
MONOCYTES NFR BLD: 6 % (ref 5–13)
NEUTS SEG # BLD: 0.8 K/UL (ref 1.8–8)
NEUTS SEG NFR BLD: 28 % (ref 32–75)
NRBC # BLD: 0 K/UL (ref 0–0.01)
NRBC BLD-RTO: 0 PER 100 WBC
PATH REV BLD -IMP: ABNORMAL
PHOSPHATE SERPL-MCNC: 4.6 MG/DL (ref 2.6–4.7)
PLATELET # BLD AUTO: 150 K/UL (ref 150–400)
PMV BLD AUTO: 11.2 FL (ref 8.9–12.9)
POTASSIUM SERPL-SCNC: 3.8 MMOL/L (ref 3.5–5.1)
PROT SERPL-MCNC: 7.4 G/DL (ref 6.4–8.2)
RBC # BLD AUTO: 4.09 M/UL (ref 3.8–5.2)
RBC MORPH BLD: ABNORMAL
SODIUM SERPL-SCNC: 140 MMOL/L (ref 136–145)
WBC # BLD AUTO: 3 K/UL (ref 3.6–11)
WBC MORPH BLD: ABNORMAL

## 2019-07-24 PROCEDURE — 74177 CT ABD & PELVIS W/CONTRAST: CPT

## 2019-07-24 PROCEDURE — 36415 COLL VENOUS BLD VENIPUNCTURE: CPT

## 2019-07-24 PROCEDURE — 80053 COMPREHEN METABOLIC PANEL: CPT

## 2019-07-24 PROCEDURE — 74011250636 HC RX REV CODE- 250/636: Performed by: NURSE PRACTITIONER

## 2019-07-24 PROCEDURE — 96372 THER/PROPH/DIAG INJ SC/IM: CPT

## 2019-07-24 PROCEDURE — 74011000250 HC RX REV CODE- 250: Performed by: NURSE PRACTITIONER

## 2019-07-24 PROCEDURE — 36591 DRAW BLOOD OFF VENOUS DEVICE: CPT

## 2019-07-24 PROCEDURE — 83735 ASSAY OF MAGNESIUM: CPT

## 2019-07-24 PROCEDURE — 85025 COMPLETE CBC W/AUTO DIFF WBC: CPT

## 2019-07-24 PROCEDURE — 86300 IMMUNOASSAY TUMOR CA 15-3: CPT

## 2019-07-24 PROCEDURE — 84100 ASSAY OF PHOSPHORUS: CPT

## 2019-07-24 PROCEDURE — 71260 CT THORAX DX C+: CPT

## 2019-07-24 RX ORDER — SODIUM CHLORIDE 9 MG/ML
10 INJECTION INTRAMUSCULAR; INTRAVENOUS; SUBCUTANEOUS AS NEEDED
Status: DISCONTINUED | OUTPATIENT
Start: 2019-07-24 | End: 2019-07-25 | Stop reason: HOSPADM

## 2019-07-24 RX ORDER — BARIUM SULFATE 20 MG/ML
900 SUSPENSION ORAL
Status: COMPLETED | OUTPATIENT
Start: 2019-07-24 | End: 2019-07-24

## 2019-07-24 RX ORDER — HEPARIN 100 UNIT/ML
300-500 SYRINGE INTRAVENOUS AS NEEDED
Status: DISCONTINUED | OUTPATIENT
Start: 2019-07-24 | End: 2019-07-25 | Stop reason: HOSPADM

## 2019-07-24 RX ORDER — SODIUM CHLORIDE 0.9 % (FLUSH) 0.9 %
10 SYRINGE (ML) INJECTION
Status: COMPLETED | OUTPATIENT
Start: 2019-07-24 | End: 2019-07-24

## 2019-07-24 RX ORDER — SODIUM CHLORIDE 0.9 % (FLUSH) 0.9 %
10 SYRINGE (ML) INJECTION AS NEEDED
Status: DISCONTINUED | OUTPATIENT
Start: 2019-07-24 | End: 2019-07-25 | Stop reason: HOSPADM

## 2019-07-24 RX ADMIN — Medication 10 ML: at 13:29

## 2019-07-24 RX ADMIN — SODIUM CHLORIDE 10 ML: 9 INJECTION, SOLUTION INTRAMUSCULAR; INTRAVENOUS; SUBCUTANEOUS at 13:29

## 2019-07-24 RX ADMIN — Medication 10 ML: at 08:47

## 2019-07-24 RX ADMIN — SODIUM CHLORIDE, PRESERVATIVE FREE 500 UNITS: 5 INJECTION INTRAVENOUS at 13:29

## 2019-07-24 RX ADMIN — BARIUM SULFATE 900 ML: 20 SUSPENSION ORAL at 08:47

## 2019-07-24 RX ADMIN — DENOSUMAB 120 MG: 120 INJECTION SUBCUTANEOUS at 15:05

## 2019-07-24 NOTE — PROGRESS NOTES
Mahin Bejarano is a 52 y.o. female  Chief Complaint   Patient presents with    Follow-up     Metastatic Breast Cancer     1. Have you been to the ER, urgent care clinic since your last visit? Hospitalized since your last visit? No.    2. Have you seen or consulted any other health care providers outside of the [address] [place] [address] since your last visit? Include any pap smears or colon screening.  No.

## 2019-07-24 NOTE — PROGRESS NOTES
[facility] [place] [address], [address] [facility], [facility]: [phone]  F: [phone]    Reason for Visit:   Micaela Ames is a 52 y.o. female who is seen for follow up of metastatic breast cancer on Ibrance/Femara/Xgeva. Treatment History:   Anastrozole/Ibrance/Xgeva [place] 3/18 in [place]    STAGE: dx 8/17 4 with L2 bone mets biopsied in [place]  Prior stage 1 RIGHT breast cancer 2016 lump/XRT Tamoxifen    History of Present Illness:   Patient seen today for office for f/u metastatic breast cancer on Ibrance/Femara/Xgeva. Patient is feeling well overall. She is tolerating Ibrance well. CTs now show progressive disease in liver. Pt denies pain. Moved already to [place]. Here with  today. Cannot do any new treatment here. Do not have all prior chemo records here. Oncologic Hx:  Metastatic breast cancer ER+ HER2 negative seen and treated in [place]. Patient initially dx in 2011 with ? Met breast cancer to liver. Had chemo IV then Xeloda for ? 5 years. Had Lupron. Patient had mammo 2015 which was abnormal and then had breast biopsy and breast cancer. Then patient had breast surgery/ XRT. During this time patient states liver mets \"went away\". F/u visit 2017 showed bone met lumbar spine. Patient was on Arimidex. Patient is currently on Femara/Ibrance/Xgeva since 3/18. Last PET 3/18 negative except bone. Liver contour abnormalities thought to be cirrhosis. Patient has a port.    CTs 3/19 at [facility] stable    Past Medical History:   Diagnosis Date    Arthritis     Cancer ([facility]     Diabetes ([facility]       Past Surgical History:   Procedure Laterality Date    BREAST SURGERY PROCEDURE UNLISTED      partial mascetomies    HX LAP CHOLECYSTECTOMY        Social History     Tobacco Use    Smoking status: Never Smoker    Smokeless tobacco: Never Used   Substance Use Topics    Alcohol use: Yes     Comment: socially       Family History   Problem Relation Age of Onset    Cancer Mother     Arthritis-osteo Mother     Hypertension Mother     Heart Disease Father     Hypertension Sister     Hypertension Brother      Current Outpatient Medications   Medication Sig    cetirizine (ZYRTEC) 10 mg tablet Take 10 mg by mouth.  magnesium 250 mg tab Take  by mouth daily.  aspirin delayed-release 81 mg tablet Take  by mouth daily.  letrozole (FEMARA) 2.5 mg tablet Take 1 Tab by mouth daily.  FEMARA 2.5 mg tablet TK 1 T PO QD    palbociclib 75 mg cap Take 1 capsule (75mg) daily for 21 days on followed by 7 days off    multivitamin (ONE A DAY) tablet multivitamin    B infantis/B ani/B ivory/B bifid (PROBIOTIC 4X PO) Probiotic    denosumab (XGEVA SC) Xgeva    ascorbic acid, vitamin C, (VITAMIN C) 250 mg tablet Take 500 mg by mouth two (2) times a day.  calcium-cholecalciferol, D3, (CALTRATE 600+D) tablet Take 1 Tab by mouth daily.  metFORMIN (GLUCOPHAGE) 500 mg tablet Take  by mouth daily. No current facility-administered medications for this visit. Facility-Administered Medications Ordered in Other Visits   Medication Dose Route Frequency    denosumab (XGEVA) injection 120 mg  120 mg SubCUTAneous ONCE    saline peripheral flush soln 10 mL  10 mL InterCATHeter PRN    sodium chloride 0.9% injection 10 mL  10 mL IntraVENous PRN    heparin (porcine) pf 300-500 Units  300-500 Units InterCATHeter PRN      Allergies   Allergen Reactions    Adhesive Rash      Review of Systems: A complete review of systems was obtained, negative except as described above. Physical Exam:     Visit Vitals  /85 (BP 1 Location: Left arm, BP Patient Position: Sitting)   Pulse (!) 56   Temp 97.6 °F (36.4 °C) (Oral)   Ht 5' 4\" (1.626 m)   Wt 238 lb (108 kg)   SpO2 98%   BMI 40.85 kg/m²     ECOG PS: 0  General: No distress  Eyes: anicteric sclerae  HENT: Atraumatic  Neck: Supple  MS: Normal gait and station. Skin: No rashes, ecchymoses, or petechiae. Normal temperature, turgor, and texture. Psych: Alert, oriented, appropriate affect, normal judgment/insight    Results:     Lab Results   Component Value Date/Time    WBC 3.0 (L) 07/24/2019 01:26 PM    HGB 12.6 07/24/2019 01:26 PM    HCT 38.5 07/24/2019 01:26 PM    PLATELET 234 63/07/0253 01:26 PM    MCV 94.1 07/24/2019 01:26 PM    ABS. NEUTROPHILS 0.8 (L) 07/24/2019 01:26 PM     Lab Results   Component Value Date/Time    Sodium 140 07/24/2019 01:26 PM    Potassium 3.8 07/24/2019 01:26 PM    Chloride 104 07/24/2019 01:26 PM    CO2 30 07/24/2019 01:26 PM    Glucose 98 07/24/2019 01:26 PM    BUN 15 07/24/2019 01:26 PM    Creatinine 1.15 (H) 07/24/2019 01:26 PM    GFR est AA 59 (L) 07/24/2019 01:26 PM    GFR est non-AA 49 (L) 07/24/2019 01:26 PM    Calcium 9.7 07/24/2019 01:26 PM     Lab Results   Component Value Date/Time    Bilirubin, total 0.6 07/24/2019 01:26 PM    ALT (SGPT) 22 07/24/2019 01:26 PM    AST (SGOT) 25 07/24/2019 01:26 PM    Alk. phosphatase 55 07/24/2019 01:26 PM    Protein, total 7.4 07/24/2019 01:26 PM    Albumin 3.8 07/24/2019 01:26 PM    Globulin 3.6 07/24/2019 01:26 PM     CT Results (most recent):  Results from [place] encounter on 07/24/19   CT CHEST W CONT    Narrative EXAM:  CT CHEST W CONT, CT ABD PELV W CONT    INDICATION: metastatic breast cancer, on therapy, evaluate response to treatment    COMPARISON: CT chest abdomen pelvis 3/19/2019. CONTRAST:  100 mL of Isovue-370. TECHNIQUE:   Following the uneventful intravenous administration of contrast, thin axial  images were obtained through the chest, abdomen and pelvis. Coronal and sagittal  reconstructions were generated. Oral contrast was administered. CT dose  reduction was achieved through use of a standardized protocol tailored for this  examination and automatic exposure control for dose modulation.     FINDINGS:   Chest:  Lungs/Pleura: New small groundglass opacities in the posterior segment of the  right lower lobe (series 2 image 3435). No pleural effusion. No pneumothorax. No  suspicious pulmonary nodules. Axilla/Soft Tissue: Stable fluid collection in the right medial breast measuring  4.2 x 2.4 cm, with surrounding surgical clips. Stable postsurgical changes of  right axillary shahana dissection. No pathologic axillary adenopathy. Mediastinum: Left chest port with tip terminating in the mid SVC. The heart is  normal in size. No pericardial effusion. Stable right lower paratracheal lymph  node measuring 12 mm. Interval increase in size of two periaortic lymph nodes  measuring 7 mm 8 mm (series 2 image 20). Bones: No evidence of acute fracture, dislocation, or aggressive osseous  abnormality. Abdomen/Pelvis:  Liver:  Redemonstrated nodular liver contour consistent with pseudocirrhosis. Again seen are multiple hepatic metastases, which overall have increased in size  since prior exam. Hypodense lesion in hepatic segment 5/6 now measures 4.6 x 2.5  cm, previously measuring 3.0 x 1.6 cm. Hypodense lesion in hepatic segment IVb  now measures 3.0 x 2.1 cm (series 2 image 54). Hypodense lesion in hepatic  segment 7 now measures 2.7 x 2.4 cm (series 2 image 42). Biliary system: Gallbladder is surgically absent. No intrahepatic or  extrahepatic biliary ductal dilatation. Spleen: Normal.    Pancreas: Normal.    Kidneys/Ureters/Bladder: No renal masses. No renal or ureteral calculi. No  hydronephrosis or hydroureter. The bladder is normal.    Adrenals: Normal.    Stomach/bowel: No dilation or abnormal wall thickening is present. No free  intraperitoneal air noted. Diverticulosis of the descending and sigmoid colon. Reproductive Organs: Redemonstrated enlarged leiomyomatous uterus, with  unchanged dominant fibroid in the right fundus with calcifications measuring 6.0  x 4.7 cm. No suspicious adnexal masses. Vasculature: Normal caliber arteries. Portal vein, SMV, and splenic vein are  patent.     Nodes: No pathologically enlarged lymph nodes. Fluid: No free fluid. Bones/Soft Tissue: Stable sclerotic lesion in the left L1 vertebra, involving  vertebral body and pedicle. No new aggressive osseous lesion or acute fracture  identified. Impression IMPRESSION:  Chest:  1. Interval increase in size of subcentimeter periaortic mediastinal lymph  nodes, which may be reactive or metastatic. Attention on follow-up. Stable  mildly enlarged right lower paratracheal lymph node. 2. New small groundglass opacities in the posterior segment of the right lower  lobe, favored to be infectious or inflammatory. 3. Otherwise no evidence of metastatic disease in the chest. Stable chronic  right breast seroma. Abdomen/pelvis:  1. Interval increase in size of numerous hepatic metastases, consistent with  metastatic disease progression. 2. Redemonstrated nodular contour of the liver, consistent with pseudocirrhosis  from partially treated hepatic metastases. 3. Stable sclerotic metastasis in L1.    23X           Records reviewed and summarized above. Pathology report(s) reviewed above. Radiology report(s) reviewed above. Assessment/PLAN:     1)  Metastatic Breast Cancer ER+ HER2 negative to bones and liver from [place]. Prior stage 1 breast cancer 2011 and 2016. Patient reports prior hx of liver mets with hx of chemo IV and Xeloda. No complete records from [place] onocology. Patient is a good historian. No evidence of liver mets now per patient. Last PET 3/18 negative except for bone mets. current regimen from [place] Femara/Ibrance/Xgeva. CTs 11/18 show nonspecific liver disease. Present on prior CTs. CTs 3/19/19 at [facility] due to cost per patient. CTs stable. CTs 7/19 progressive liver mets with slight increase in size. Reviewed CTs with pt and  today. Progressive disease on ibrance/ femara/ xgeva. We do not have prior chemo records. Pt is moved to [facility]. already.    Discussed will need a new plan of treatment. Seems pt has had most hormonal therapy agents except faslodex. May need IV chemo now such as abraxane/ taxane. Had xeloda in the past.   Would do foundation one testing. Patient clinically has no pain and is doing well overall. Pt has appt with new oncologist in [place]. Labs good today except low wbc and pt will stop ibrance and continue femara until new appt. 2) Liver Mets with progressive disease on CTs 7/19. 3)  Possible preDM  Management per PCP. 4) Psychosocial  Mood good. Coping well. Good support from family. Call if questions. F/u here prn. Pt has moved to [place]. I appreciate the opportunity to participate in [facility]. [address] [facility].     Signed By: Amanda Demarco DO

## 2019-07-24 NOTE — PROGRESS NOTES
[facility] [place]  Pt has appt August in [place]   Will not f/u here  Pt needs records

## 2019-07-24 NOTE — PROGRESS NOTES
[address] [facility] @ [facility] VISIT NOTE     5266 Patient arrives for Xgeva q 4 week Injection without acute problems. Please see connect care for complete assessment and education provided. Vital signs stable throughout and prior to discharge, Pt. Tolerated treatment well and discharged without incident. Patient is aware of no further [facility] appointments @ this time as she is moving to [place]. Medications Verified by Nada Phalen RN & Ritu Grijalva RN via [employer]:  1. Xgeva 120 mg SQ  2. Heparin 500 units IV flush via port  3. NS flush via port     VITAL SIGNS  Patient Vitals for the past 12 hrs:   Temp Pulse Resp BP SpO2   07/24/19 1508 98.1 °F (36.7 °C) 62 16 142/88 98 %   07/24/19 1327 98.1 °F (36.7 °C) 60 16 135/85 98 %        LAB WORK Labs Pending, please see connect care for results. Recent Results (from the past 12 hour(s))   CBC WITH AUTOMATED DIFF    Collection Time: 07/24/19  1:26 PM   Result Value Ref Range    WBC 3.0 (L) 3.6 - 11.0 K/uL    RBC 4.09 3.80 - 5.20 M/uL    HGB 12.6 11.5 - 16.0 g/dL    HCT 38.5 35.0 - 47.0 %    MCV 94.1 80.0 - 99.0 FL    MCH 30.8 26.0 - 34.0 PG    MCHC 32.7 30.0 - 36.5 g/dL    RDW 14.9 (H) 11.5 - 14.5 %    PLATELET 264 125 - 204 K/uL    MPV 11.2 8.9 - 12.9 FL    NRBC 0.0 0  WBC    ABSOLUTE NRBC 0.00 0.00 - 0.01 K/uL    NEUTROPHILS 28 (L) 32 - 75 %    LYMPHOCYTES 63 (H) 12 - 49 %    MONOCYTES 6 5 - 13 %    EOSINOPHILS 0 0 - 7 %    BASOPHILS 3 (H) 0 - 1 %    IMMATURE GRANULOCYTES 0 %    ABS. NEUTROPHILS 0.8 (L) 1.8 - 8.0 K/UL    ABS. LYMPHOCYTES 1.9 0.8 - 3.5 K/UL    ABS. MONOCYTES 0.2 0.0 - 1.0 K/UL    ABS. EOSINOPHILS 0.0 0.0 - 0.4 K/UL    ABS. BASOPHILS 0.1 0.0 - 0.1 K/UL    ABS. IMM.  GRANS. 0.0 K/UL    DF MANUAL      RBC COMMENTS ANISOCYTOSIS  1+        RBC COMMENTS MACROCYTOSIS  1+        RBC COMMENTS OVALOCYTES  PRESENT        WBC COMMENTS ATYPICAL LYMPHOCYTES PRESENT     METABOLIC PANEL, COMPREHENSIVE    Collection Time: 07/24/19  1:26 PM   Result Value Ref Range    Sodium 140 136 - 145 mmol/L    Potassium 3.8 3.5 - 5.1 mmol/L    Chloride 104 97 - 108 mmol/L    CO2 30 21 - 32 mmol/L    Anion gap 6 5 - 15 mmol/L    Glucose 98 65 - 100 mg/dL    BUN 15 6 - 20 MG/DL    Creatinine 1.15 (H) 0.55 - 1.02 MG/DL    BUN/Creatinine ratio 13 12 - 20      GFR est AA 59 (L) >60 ml/min/1.73m2    GFR est non-AA 49 (L) >60 ml/min/1.73m2    Calcium 9.7 8.5 - 10.1 MG/DL    Bilirubin, total 0.6 0.2 - 1.0 MG/DL    ALT (SGPT) 22 12 - 78 U/L    AST (SGOT) 25 15 - 37 U/L    Alk.  phosphatase 55 45 - 117 U/L    Protein, total 7.4 6.4 - 8.2 g/dL    Albumin 3.8 3.5 - 5.0 g/dL    Globulin 3.6 2.0 - 4.0 g/dL    A-G Ratio 1.1 1.1 - 2.2     MAGNESIUM    Collection Time: 07/24/19  1:26 PM   Result Value Ref Range    Magnesium 2.2 1.6 - 2.4 mg/dL   PHOSPHORUS    Collection Time: 07/24/19  1:26 PM   Result Value Ref Range    Phosphorus 4.6 2.6 - 4.7 MG/DL

## 2019-07-24 NOTE — LETTER
7/24/19 Patient: Juan A Cunningham YOB: 1965 Date of Visit: 7/24/2019 Laura Muse MD 
[address] [facility] [address] [place] [address] [id number] VIA Facsimile: [phone] Dear Laura Muse MD, Thank you for referring Ms. Bandar Christiansen to [address] [place] [address] for evaluation. My notes for this consultation are attached. If you have questions, please do not hesitate to call me. I look forward to following your patient along with you. Sincerely, Brant Dickerson, DO

## 2019-07-25 LAB — CANCER AG27-29 SERPL-ACNC: 106.8 U/ML (ref 0–38.6)

## 2019-07-26 DIAGNOSIS — C50.919 METASTATIC BREAST CANCER (HCC): Primary | ICD-10-CM

## 2019-07-26 RX ORDER — ONDANSETRON 8 MG/1
8 TABLET, ORALLY DISINTEGRATING ORAL
Qty: 60 TAB | Refills: 2 | Status: SHIPPED | OUTPATIENT
Start: 2019-07-26

## 2019-07-30 ENCOUNTER — DOCUMENTATION ONLY (OUTPATIENT)
Dept: ONCOLOGY | Age: 54
End: 2019-07-30

## 2019-07-30 NOTE — PROGRESS NOTES
Per provider request, last OV note and recent labs mailed to patient. Transferring care d/t move to [facility]..

## 2019-08-02 ENCOUNTER — TELEPHONE (OUTPATIENT)
Dept: ONCOLOGY | Age: 54
End: 2019-08-02

## 2019-08-02 NOTE — TELEPHONE ENCOUNTER
Patient called and stated that our office was supposed to send her records to her new doctor because she is moving. Patient stated that they need records from this year.  Notes faxed to [phone]

## 2019-08-07 ENCOUNTER — TELEPHONE (OUTPATIENT)
Dept: ONCOLOGY | Age: 54
End: 2019-08-07

## 2019-08-07 NOTE — TELEPHONE ENCOUNTER
Call to [facility]. Spoke with Dominique Retana. [phone]. [place] out of state and will be establishing care with new provider. Information noted.

## 2019-08-28 ENCOUNTER — APPOINTMENT (OUTPATIENT)
Dept: INFUSION THERAPY | Age: 54
End: 2019-08-28

## 2019-09-25 ENCOUNTER — APPOINTMENT (OUTPATIENT)
Dept: INFUSION THERAPY | Age: 54
End: 2019-09-25

## 2023-12-29 NOTE — TELEPHONE ENCOUNTER
Call returned to Lux Chavez. Request to clarify which imaging is needed to fax. Transferred to Lily/clinical choice plan and stated that patient was given choice of facilities with cost to have CT scans and chose [facility]. WIll fax orders and to provider for review.
Call to patient. HIPAA verified. RN confirmed that CT scans to be done at [facility]. Patient confirmed due to cost.  RN requested that patient obtain disc and bring to next provider appointment for uploading to [address] [place] [address]. Patient verbalized understanding. Orders for scans faxed complete to [facility].
Neida Daly Informed Choiceing called on be half of patient stating that the patients radiology orders need to be faxed over to [facility].      # [phone]    Authorization # [id number]    Fax [phone]
Per Paco/File Room patient disc from [facility] BD does not match our patient's BD.  RN advised would follow with [facility] for clarification. Call to [facility]/Radiology/Ritchie. Verified that patient's birth date in system is correct. Per Georgina Hoffman a 4 was typed in the place of the 7 for birth month on the CT imaging report. RN requested corrected information to place in our system. Per Racquel/STANISLAW she is not able to change date and that 4 was entered incorrectly. Delvis Kulkarni also stated that there was only one Carollee Prom in the [address] system. Delvis Kulkarni also stated that I was not able to amend the report with the correct birth date. ( 8 min)    Per Jolene SEXTON corrected and verified with writer.
see MDM/progress notes